# Patient Record
Sex: FEMALE | Race: BLACK OR AFRICAN AMERICAN | NOT HISPANIC OR LATINO | Employment: FULL TIME | URBAN - METROPOLITAN AREA
[De-identification: names, ages, dates, MRNs, and addresses within clinical notes are randomized per-mention and may not be internally consistent; named-entity substitution may affect disease eponyms.]

---

## 2017-09-25 ENCOUNTER — OFFICE VISIT (OUTPATIENT)
Dept: INTERNAL MEDICINE | Facility: CLINIC | Age: 37
End: 2017-09-25
Attending: FAMILY MEDICINE
Payer: MEDICAID

## 2017-09-25 VITALS
BODY MASS INDEX: 48.82 KG/M2 | WEIGHT: 293 LBS | OXYGEN SATURATION: 99 % | SYSTOLIC BLOOD PRESSURE: 158 MMHG | HEIGHT: 65 IN | TEMPERATURE: 98 F | DIASTOLIC BLOOD PRESSURE: 102 MMHG | HEART RATE: 80 BPM

## 2017-09-25 DIAGNOSIS — F32.A DEPRESSION, UNSPECIFIED DEPRESSION TYPE: ICD-10-CM

## 2017-09-25 DIAGNOSIS — R29.818 NEUROLOGIC ABNORMALITY: Primary | ICD-10-CM

## 2017-09-25 DIAGNOSIS — E66.01 MORBID OBESITY DUE TO EXCESS CALORIES: ICD-10-CM

## 2017-09-25 DIAGNOSIS — I10 ESSENTIAL HYPERTENSION: ICD-10-CM

## 2017-09-25 DIAGNOSIS — K21.9 GASTROESOPHAGEAL REFLUX DISEASE, ESOPHAGITIS PRESENCE NOT SPECIFIED: ICD-10-CM

## 2017-09-25 DIAGNOSIS — M25.519 SHOULDER PAIN, UNSPECIFIED CHRONICITY, UNSPECIFIED LATERALITY: ICD-10-CM

## 2017-09-25 DIAGNOSIS — Z00.00 HEALTHCARE MAINTENANCE: ICD-10-CM

## 2017-09-25 PROCEDURE — 99205 OFFICE O/P NEW HI 60 MIN: CPT | Mod: PBBFAC,PO | Performed by: INTERNAL MEDICINE

## 2017-09-25 PROCEDURE — 99204 OFFICE O/P NEW MOD 45 MIN: CPT | Mod: S$PBB,,, | Performed by: INTERNAL MEDICINE

## 2017-09-25 PROCEDURE — 99999 PR PBB SHADOW E&M-NEW PATIENT-LVL V: CPT | Mod: PBBFAC,,, | Performed by: INTERNAL MEDICINE

## 2017-09-25 RX ORDER — PANTOPRAZOLE SODIUM 20 MG/1
20 TABLET, DELAYED RELEASE ORAL DAILY
Qty: 30 TABLET | Refills: 11 | Status: SHIPPED | OUTPATIENT
Start: 2017-09-25 | End: 2018-09-25

## 2017-09-25 RX ORDER — LISINOPRIL AND HYDROCHLOROTHIAZIDE 12.5; 2 MG/1; MG/1
1 TABLET ORAL DAILY
Qty: 90 TABLET | Refills: 3 | Status: SHIPPED | OUTPATIENT
Start: 2017-09-25 | End: 2020-07-02 | Stop reason: SDUPTHER

## 2017-09-25 RX ORDER — LISINOPRIL AND HYDROCHLOROTHIAZIDE 12.5; 2 MG/1; MG/1
TABLET ORAL
Refills: 2 | COMMUNITY
Start: 2017-07-12 | End: 2017-09-25 | Stop reason: SDUPTHER

## 2017-09-25 NOTE — PATIENT INSTRUCTIONS
Exercises for Shoulder Flexibility: Internal Rotation    This stretch can help restore shoulder flexibility and relieve pain over time. When stretching, be sure to breathe deeply. And follow any special instructions from your doctor or physical therapist.  · While seated, move the arm on the side you want to stretch toward the middle of your back. The palm of your hand should face out.  · Cup your other hand under the hand thats behind your back. Gently push your cupped hand upward until you feel the stretch in the shoulder. Try to hold the stretch for 5 seconds.  · Work up to doing 3 sets of this stretch, 3 times a day. Work up to holding the stretch for 30-60 seconds.  Note: Keep your back straight. Its okay if your hand cant reach the middle of your back. Instead, start the stretch with your hand as close as you can get it to the middle of your back.     Frozen Shoulder  Frozen shoulder is another name for adhesive capsulitis, which causes restricted movement in the shoulder. If you have frozen shoulder, this stretch may cause discomfort, especially when you first get started. A few months may pass before you achieve the results you want. But once your shoulder heals, it almost never becomes frozen again. So stick to your stretching program. If you have any questions, be sure to ask your doctor.   © 1887-2412 [employer]. [address] [place] [address], [place] [address]. All rights reserved. This information is not intended as a substitute for professional medical care. Always follow your healthcare professional's instructions.        Exercises for Shoulder Flexibility: Wall Walk  Improving your flexibility can reduce pain. Stretching exercises also can help increase your range of pain-free motion. Breathe normally when you exercise. And try to use smooth, fluid movements.  Note: Follow any special instructions you are given. If you feel pain, stop the exercise. If the pain continues after stopping,  call your healthcare provider.  · Stand with your shoulder about 2 feet from the wall.  · Raise your arm to shoulder level and gently walk your fingers up the wall as high as you can.  · Hold for a few seconds. Then walk your fingers back down.  · Repeat 3 times. Move closer to the wall as you repeat.  · Build up to holding each stretch for 30 seconds.  CAUTION: Do this stretch only if your healthcare provider recommends it. Dont do it when you are first injured.          © 0485-7645 [employer]. [address] [place] [address]. All rights reserved. This information is not intended as a substitute for professional medical care. Always follow your healthcare professional's instructions.        Exercises for Shoulder Flexibility: Pendulum Exercise   Improving your flexibility can reduce pain. Stretching exercises also can help increase your range of pain-free motion. Breathe normally when you exercise. And try to use smooth, fluid movements.  Follow any special instructions you are given. If you feel pain, stop the exercise. If the pain continues after stopping, call your health care provider.  · Lean over with your good arm supported on a table or chair.  · Relax the arm on the painful side, letting it hang straight down.  · Slowly begin to swing the relaxed arm. Move it in a small [employer], gradually making it bigger if you can. Then reverse the direction. Next, move it backward and forward. Finally, move it side to side.     Note: Spend about 5 minutes doing the exercise, 3 times a day. Change direction after 1 minute of motion.   © 9722-6957 [employer]. [address] [place] [address]. All rights reserved. This information is not intended as a substitute for professional medical care. Always follow your healthcare professional's instructions.        Exercises for Shoulder Flexibility: Broom Stretch    Improving your flexibility can reduce pain. Stretching exercises  also can help increase your range of pain-free motion. Breathe normally when you exercise. Try to use smooth, fluid movements. Never force a stretch.  · Stand up or lie on the floor. Place the palm of your hand over the end of a broomstick or cane. Grasp the stick farther down with the other hand, palm facing down.  · Push the end of the stick up on the side of your injured shoulder as high as is comfortable.  · Hold for a few seconds. Return to the starting position.  · Repeat 3-5 times. Build up to holding each stretch for 10-30  seconds.  Note: Follow any special instructions you are given. If you feel pain, stop the exercise. If the pain continues after stopping, call your healthcare provider.   © 1239-3587 [employer]. [address] [place] [address], [place] [address]. All rights reserved. This information is not intended as a substitute for professional medical care. Always follow your healthcare professional's instructions.

## 2017-09-25 NOTE — PROGRESS NOTES
Subjective:       Patient ID: Yane Dobbins is a 37 y.o. female.    Chief Complaint: Establish Care; Foot Pain (both heels, right foot numbing, ); Arm Pain (right side when lifting numbing/heavy, arm just drop); Hypertension; and Leg Pain (right side feels like hip down leg thigh and burning sensation in calf)    Ms. Dobbins is a 37-year-old woman with GERD, HTN, morbid obesity, reported fibromyalgia, and MDD/anxiety who presents to [facility]. She was recently discharged from [facility] after an admission for neurologic abnormalities, which appear to have started in 2014 with numbness of the 3rd, 4th, and 5th digits of her right hand that slowly progressed over the course of a year to involve her entire right arm. In 2016 she began to experience a similar phenomenon in her right lower extremity, starting with numbness in her right 3rd, 4th, and 5th toes eventually progressing to numbness of the entire right lower extremity. In 2017 this advanced to neuropathic pain (shooting, tingling), worst after rest. She has not sought any medical care for these symptoms. Last Wednesday she experienced the sudden onset of complete numbness and paralysis of her right side, along with blurry vision in both eyes. She reports being worked up for TIA with CT of her head, MRI of her neck, ultrasound examination of her carotid arteries bilaterally, and a TTE, all of which was reportedly normal. Throughout this she has experienced significantly worsening right shoulder discomfort, worst with abduction of her right upper extremity and any rotation. 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       Review of Systems   Constitutional: Negative for chills, fatigue, fever and unexpected weight change.   HENT: Negative for congestion, ear pain, hearing loss, mouth sores, rhinorrhea, sinus pressure and sore throat.    Eyes: Positive for visual disturbance. Negative  for pain and redness.   Respiratory: Negative for cough, shortness of breath and wheezing.    Cardiovascular: Negative for chest pain, palpitations and leg swelling.   Gastrointestinal: Negative for abdominal distention, abdominal pain, blood in stool, constipation, diarrhea, nausea and vomiting.   Endocrine: Negative for cold intolerance, heat intolerance and polyuria.   Genitourinary: Negative for difficulty urinating, dysuria, menstrual problem, vaginal bleeding and vaginal discharge.   Musculoskeletal: Positive for arthralgias, gait problem and myalgias.   Skin: Negative for rash and wound.   Allergic/Immunologic: Negative for environmental allergies and food allergies.   Neurological: Positive for weakness and numbness. Negative for dizziness, seizures and headaches.   Hematological: Negative for adenopathy. Does not bruise/bleed easily.   Psychiatric/Behavioral: Positive for dysphoric mood. Negative for agitation. The patient is not nervous/anxious.        Past Medical History:   Diagnosis Date    Anxiety     Depression     GERD (gastroesophageal reflux disease)     H/O fibromyalgia     Obesity     TIA (transient ischemic attack)     Toxemia in pregnancy        Past Surgical History:   Procedure Laterality Date     SECTION      CHOLECYSTECTOMY      FOOT SURGERY         Family History   Problem Relation Age of Onset    Hypertension Mother     Diabetes Mother     Diabetes Father     Hypertension Father     Hypertension Brother        Social History     Social History    Marital status: Single     Spouse name: N/A    Number of children: N/A    Years of education: N/A     Occupational History    Not on file.     Social History Main Topics    Smoking status: Passive Smoke Exposure - Never Smoker     Last attempt to quit: 1998    Smokeless tobacco: Never Used      Comment: in high school    Alcohol use No    Drug use: No    Sexual activity: No     Other Topics Concern    Not on  "file     Social History Narrative    No narrative on file       Objective:         Vitals:    09/25/17 1513   BP: (!) 158/102   Pulse: 80   Temp: 98.4 °F (36.9 °C)   SpO2: 99%   Weight: (!) 160.8 kg (354 lb 8 oz)   Height: 5' 5" (1.651 m)     Physical Exam   Constitutional: She is oriented to person, place, and time. She appears well-developed and well-nourished. No distress.   HENT:   Head: Normocephalic and atraumatic.   Eyes: Conjunctivae and EOM are normal. Pupils are equal, round, and reactive to light.   Neck: Normal range of motion. Neck supple. No JVD present.   Cardiovascular: Normal rate and regular rhythm.  Exam reveals no gallop and no friction rub.    No murmur heard.  Pulmonary/Chest: Effort normal and breath sounds normal. No respiratory distress. She has no wheezes.   Abdominal: Soft. Bowel sounds are normal. She exhibits no distension and no mass. There is no tenderness.   Morbidly obese   Musculoskeletal: She exhibits tenderness. She exhibits no edema.   Right shoulder pain with passive abduction, but good strength without any drop when not supported  Posterior right knee pain   Neurological: She is alert and oriented to person, place, and time. She displays normal reflexes. No cranial nerve deficit.   Skin: Skin is warm and dry. No rash noted. No erythema.   Psychiatric: She has a normal mood and affect. Her behavior is normal.       Assessment:       1. Neurologic abnormality    2. Depression, unspecified depression type    3. Essential hypertension    4. Morbid obesity due to excess calories    5. Gastroesophageal reflux disease, esophagitis presence not specified    6. Shoulder pain, unspecified chronicity, unspecified laterality    7. Healthcare maintenance        Plan:       Yane was seen today for establish care, foot pain, arm pain, hypertension and leg pain.    Diagnoses and all orders for this visit:    Neurologic abnormality  -     Etiology unclear. Beginning basic workup for " peripheral neuropathy with HbA1c, BMP, and EMG + NCS.   -     Requested records from workup at [facility]  -     Referred to neurology for further workup  -     Nerve conduction test; Future  -     EMG - 2 Extremities; Future  -     Ambulatory Referral to Neurology    Depression, unspecified depression type        -     Stable        -     She is on an antihypertensive, but doesn't know which one and will call the clinic tomorrow to tell us what she is taking.    Essential hypertension        -     Mildly worsened today at 158/102, etiology likely pain and stress as she appeared uncomfortable        -     Continue home lisinopril-HCTZ        -     lisinopril-hydrochlorothiazide (PRINZIDE,ZESTORETIC) 20-12.5 mg per tablet; Take 1 tablet by mouth once daily.    Morbid obesity due to excess calories        -     Counseled regarding caloric intake, particularly limiting sodas        -     Will address at subsequent visits    Gastroesophageal reflux disease, esophagitis presence not specified        -     Stable  -     pantoprazole (PROTONIX) 20 MG tablet; Take 1 tablet (20 mg total) by mouth once daily.    Shoulder pain, unspecified chronicity, unspecified laterality  -     Examination consistent with rotator cuff tendinitis, but preserved strength not suggestive of tear  -     Ambulatory consult to Physical Therapy  -     Encouraged NSAIDs PRN for pain    Healthcare maintenance  -     Hemoglobin A1c; Future  -     Lipid panel; Future  -     Basic metabolic panel; Future       Patient seen and discussed with Dr. Hinojosa, who helped formulate the above plan. Will arrange for follow-up evaluation in three months

## 2017-10-04 ENCOUNTER — CLINICAL SUPPORT (OUTPATIENT)
Dept: REHABILITATION | Facility: HOSPITAL | Age: 37
End: 2017-10-04
Payer: MEDICAID

## 2017-10-04 DIAGNOSIS — M54.12 CERVICAL RADICULOPATHY: Primary | ICD-10-CM

## 2017-10-04 PROCEDURE — 97161 PT EVAL LOW COMPLEX 20 MIN: CPT | Mod: PN

## 2017-10-04 NOTE — PROGRESS NOTES
OUTPATIENT PHYSICAL THERAPY  PHYSICAL THERAPY EVALUATION    Name: Yane Dobbins  Clinic Number: [id number]    Evaluation Date: 10/04/2017  Visit #: 1  Precautions: standard     Diagnosis: Cervical Radiculopathy   Physician: Matias Vergara MD  Treatment Orders: PT Eval and Treat  Past Medical History:   Diagnosis Date    Anxiety     Depression     GERD (gastroesophageal reflux disease)     H/O fibromyalgia     Obesity     TIA (transient ischemic attack)     Toxemia in pregnancy      Current Outpatient Prescriptions   Medication Sig    lisinopril-hydrochlorothiazide (PRINZIDE,ZESTORETIC) 20-12.5 mg per tablet Take 1 tablet by mouth once daily.    pantoprazole (PROTONIX) 20 MG tablet Take 1 tablet (20 mg total) by mouth once daily.     No current facility-administered medications for this visit.      Review of patient's allergies indicates:  No Known Allergies    36 y/o female with difficulty reaching and using R arm ongoing for greater than 3 yrs,   Pain started into her neck and has travelled into her arm.   She is unable to lift her arm to brush her hair or teeth due to pain     Subjective     Chief complaint: R arm pain and neck pain   Pain: current 7-8/10 neck and R arm pain   Radicular symptoms: Radial and medial neve distribution tingling into 1st, 2nd, 3rd fingers and palm of hand   Aggravating factors: Raising arm overhead and out to the side  Easing factors: none  Pts goals: decrease pain     Objective   Mental status: alert    Static Postural Assessment:   Fwd head and rounded shlds, R arm held in guarded position    GAIT: Yane ambulates with no assistive device with independently.     GAIT DEVIATIONS: Yane displays decreased chloé    ROM:  Cervical Rotation L = 25 % limited with pain/ R = 25 % limited with pain   Cervical SB L = 50% limited with pain   R shld flexion and abduction  75% limited * pain and guarding       Strength:   Shld strength N/T due to pain and guarding     Shld  flexion < 3/5   Wrist flexion < 3/5     Special Tests:  + ULNTT Median and Radial     Palpation:  TTP C5-6 R, median and radial N. Distribution Upper trap, pronator teres R*, carpal tunnel       Pt/family was provided educational information, including: role of PT, goals for PT, scheduling - pt verbalized understanding. Discussed insurance plan with pt.     TREATMENT   Time In: 3:00 PM   Time Out: 4:00 PM     Discussed Plan of Care with patient: Yes    Yane received  10 minutes of therapeutic exercises: medial and radial neural glides  Upper trap stretches    5 minutes of manual therapy including:    Pt signed written consent to dry needling Rx.  Pt gave verbal consent for DN.    DN Time:     Pt rec'd dry needling to   Homeostatic points:  1. Deep Radial *   2. Greater Auricular  3. Spinal Accessory  4. Saphenous  5. Deep Fibular  6. Tibial  7. Greater Occipital  8. Suprascapular ( infraspinatus) *  9. Lateral Antebrachial Cutaneous *   10. Sural  11 Lateral Popliteal  12. Superficial Radial *   13. Dorsal Scapular *   14. Superior Cluneal  15. Posterior Cutaneous L 2  16. Inferior Gluteal  17. Lateral Pectoral  18. Ilitotibal  19. Infraorbital  20. Spinous process T7  21. Posterior cutaneous  T6  22. Posterior cutaneous L 5  23. Supraorbital  24. Common fibular     Paravertebral Points:   C5-6     Symptomatic Points:   Radial  Mm point, posterior interroseus      with 50-60 mm needles;  with no adverse effects.      Written Home Exercises Provided: yes  Exercises were reviewed and Yane was able to demonstrate them prior to the end of the session. Pt received a written copy of exercises to perform at home. Yane demonstrated good  understanding of the education provided.     Assessment   Yane is a 37 y.o. female referred to outpatient physical therapy with a medical diagnosis of  R cervical radiculopathy.     Demonstrates limitations as described in the problem list.  Medical necessity is demonstrated  by the following IMPAIRMENTS/PROBLEMS:  weakness, impaired sensation, decreased upper extremity function, pain, abnormal tone, decreased ROM, impaired joint extensibility, impaired muscle length and neural tension     Positive prognostic factors include age, healing potential .   Negative prognostic factors include co-morbidities.   Pt prognosis is Good.    Pt will benefit from skilled outpatient physical therapy to address the above stated deficits, provide pt/family education, and to maximize pt's level of independence.       History  Co-morbidities and personal factors that may impact the plan of care Examination  Body Structures and Functions, activity limitations and participation restrictions that may impact the plan of care Clinical Presentation   Decision Making/ Complexity Score   Co-morbidities:   depression, high BMI and history of TIA         Personal Factors:   no deficits Body Regions:   neck  upper extremities    Body Systems:   ROM  strength    Activity limitations:   Learning and applying knowledge  no deficits    General Tasks and Commands  no deficits    Communication  no deficits    Mobility  lifting and carrying objects  fine hand use (grasping/picking up)    Self care  washing oneself (bathing, drying, washing hands)  caring for body parts (brushing teeth, shaving, grooming)    Domestic Life  doing house work (cleaning house, washing dishes, laundry)    Life Areas  higher education    Community and Social Life  no deficits    Participation Restrictions:   Changing Body position due to obesity          evolving clinical presentation with changing clinical characteristics            moderate moderate moderate moderate       Anticipated Barriers for physical therapy: scheduling conflicts, insurance limitations     GOALS: 10 - 12 weeks:   -Full ROM in R arm without pain or neural tension for improved self care activities   -improved postural awareness   -improved activation of lower traps and  rhomboids for improved postural stability   FOTO repoting to predicted level of function         Plan       Outpatient physical therapy 1- 2 times weekly to include: pt ed, HEP, therapeutic exercises, therapeutic activities, neuromuscular re-education/ balance exercises, manual therapy, and modalities prn. Cont PT for  6-8 weeks. Pt may be seen by PTA as part of the rehabilitation team.     I certify the need for these services furnished under this plan of treatment and while under my care.    Parth Mares, PT

## 2017-10-10 NOTE — PLAN OF CARE
OUTPATIENT PHYSICAL THERAPY  PHYSICAL THERAPY EVALUATION    Name: Yane Dobbins  Clinic Number: [id number]    Evaluation Date: 10/04/2017  Visit #: 1  Precautions: standard     Diagnosis: Cervical Radiculopathy   Physician: Matias Vergara MD  Treatment Orders: PT Eval and Treat  Past Medical History:   Diagnosis Date    Anxiety     Depression     GERD (gastroesophageal reflux disease)     H/O fibromyalgia     Obesity     TIA (transient ischemic attack)     Toxemia in pregnancy      Current Outpatient Prescriptions   Medication Sig    lisinopril-hydrochlorothiazide (PRINZIDE,ZESTORETIC) 20-12.5 mg per tablet Take 1 tablet by mouth once daily.    pantoprazole (PROTONIX) 20 MG tablet Take 1 tablet (20 mg total) by mouth once daily.     No current facility-administered medications for this visit.      Review of patient's allergies indicates:  No Known Allergies    38 y/o female with difficulty reaching and using R arm ongoing for greater than 3 yrs,   Pain started into her neck and has travelled into her arm.   She is unable to lift her arm to brush her hair or teeth due to pain     Subjective     Chief complaint: R arm pain and neck pain   Pain: current 7-8/10 neck and R arm pain   Radicular symptoms: Radial and medial neve distribution tingling into 1st, 2nd, 3rd fingers and palm of hand   Aggravating factors: Raising arm overhead and out to the side  Easing factors: none  Pts goals: decrease pain     Objective   Mental status: alert    Static Postural Assessment:   Fwd head and rounded shlds, R arm held in guarded position    GAIT: Yane ambulates with no assistive device with independently.     GAIT DEVIATIONS: Yane displays decreased chloé    ROM:  Cervical Rotation L = 25 % limited with pain/ R = 25 % limited with pain   Cervical SB L = 50% limited with pain   R shld flexion and abduction  75% limited * pain and guarding       Strength:   Shld strength N/T due to pain and guarding     Shld  flexion < 3/5   Wrist flexion < 3/5     Special Tests:  + ULNTT Median and Radial     Palpation:  TTP C5-6 R, median and radial N. Distribution Upper trap, pronator teres R*, carpal tunnel       Pt/family was provided educational information, including: role of PT, goals for PT, scheduling - pt verbalized understanding. Discussed insurance plan with pt.     TREATMENT   Time In: 3:00 PM   Time Out: 4:00 PM     Discussed Plan of Care with patient: Yes    Yane received  10 minutes of therapeutic exercises: medial and radial neural glides  Upper trap stretches    5 minutes of manual therapy including:    Pt signed written consent to dry needling Rx.  Pt gave verbal consent for DN.    DN Time:     Pt rec'd dry needling to   Homeostatic points:  1. Deep Radial *   2. Greater Auricular  3. Spinal Accessory  4. Saphenous  5. Deep Fibular  6. Tibial  7. Greater Occipital  8. Suprascapular ( infraspinatus) *  9. Lateral Antebrachial Cutaneous *   10. Sural  11 Lateral Popliteal  12. Superficial Radial *   13. Dorsal Scapular *   14. Superior Cluneal  15. Posterior Cutaneous L 2  16. Inferior Gluteal  17. Lateral Pectoral  18. Ilitotibal  19. Infraorbital  20. Spinous process T7  21. Posterior cutaneous  T6  22. Posterior cutaneous L 5  23. Supraorbital  24. Common fibular     Paravertebral Points:   C5-6     Symptomatic Points:   Radial  Mm point, posterior interroseus      with 50-60 mm needles;  with no adverse effects.      Written Home Exercises Provided: yes  Exercises were reviewed and Yane was able to demonstrate them prior to the end of the session. Pt received a written copy of exercises to perform at home. Yane demonstrated good  understanding of the education provided.     Assessment   Yane is a 37 y.o. female referred to outpatient physical therapy with a medical diagnosis of  R cervical radiculopathy.     Demonstrates limitations as described in the problem list.  Medical necessity is demonstrated  by the following IMPAIRMENTS/PROBLEMS:  weakness, impaired sensation, decreased upper extremity function, pain, abnormal tone, decreased ROM, impaired joint extensibility, impaired muscle length and neural tension     Positive prognostic factors include age, healing potential .   Negative prognostic factors include co-morbidities.   Pt prognosis is Good.    Pt will benefit from skilled outpatient physical therapy to address the above stated deficits, provide pt/family education, and to maximize pt's level of independence.       History  Co-morbidities and personal factors that may impact the plan of care Examination  Body Structures and Functions, activity limitations and participation restrictions that may impact the plan of care Clinical Presentation   Decision Making/ Complexity Score   Co-morbidities:   depression, high BMI and history of TIA         Personal Factors:   no deficits Body Regions:   neck  upper extremities    Body Systems:   ROM  strength    Activity limitations:   Learning and applying knowledge  no deficits    General Tasks and Commands  no deficits    Communication  no deficits    Mobility  lifting and carrying objects  fine hand use (grasping/picking up)    Self care  washing oneself (bathing, drying, washing hands)  caring for body parts (brushing teeth, shaving, grooming)    Domestic Life  doing house work (cleaning house, washing dishes, laundry)    Life Areas  higher education    Community and Social Life  no deficits    Participation Restrictions:   Changing Body position due to obesity          evolving clinical presentation with changing clinical characteristics            moderate moderate moderate moderate       Anticipated Barriers for physical therapy: scheduling conflicts, insurance limitations     GOALS: 10 - 12 weeks:   -Full ROM in R arm without pain or neural tension for improved self care activities   -improved postural awareness   -improved activation of lower traps and  rhomboids for improved postural stability   FOTO repoting to predicted level of function         Plan       Outpatient physical therapy 1- 2 times weekly to include: pt ed, HEP, therapeutic exercises, therapeutic activities, neuromuscular re-education/ balance exercises, manual therapy, and modalities prn. Cont PT for  6-8 weeks. Pt may be seen by PTA as part of the rehabilitation team.     I certify the need for these services furnished under this plan of treatment and while under my care.    Parth Mares, PT

## 2017-10-25 ENCOUNTER — TELEPHONE (OUTPATIENT)
Dept: INTERNAL MEDICINE | Facility: CLINIC | Age: 37
End: 2017-10-25

## 2017-10-25 NOTE — TELEPHONE ENCOUNTER
----- Message from Payal Nunez Dayne sent at 10/23/2017  4:04 PM CDT -----  Contact: Patient [phone]  Attn:  Matias Vergara MD    Patient stated that she went back to work too soon and needs a paper from you.    Please call and advise.    Thank You

## 2017-10-25 NOTE — TELEPHONE ENCOUNTER
Hi,  She needs to come in for to be evaluated for a work excuse note. I see that she did not keep the appts for physical therapy and lab work.  Ideally she should see Dr Vergara on the week starting 11/6 since he saw her the first time. OK to add her on to his schedule.  Thank you, Дмитрий Hinojosa

## 2017-10-25 NOTE — TELEPHONE ENCOUNTER
Spoke with patient, states she went back to work the 1st week of October but had to take off because she is in a lot of pain. States that she barely has any use of her rt arm or leg without pain. Patient is asking for something stating that she can be out of work until she can see a neurologist. States that the neurologist that she has seen prev does not have any appts until April 2018. Advised patient that we may be able to send referral to a different provider and she can call her insurance company to find out who is covered. Also advised patient that she should call her job and start paperwork for leave. Patient is asking if she can have a work excuse note. Please advise

## 2017-10-26 NOTE — TELEPHONE ENCOUNTER
Spoke with patient, states that she will need a morning appt. Patient scheduled to see Dr. Hinojosa on 11/6/17 at 10 AM. Also rescheduled labs. Patient states that the PT that she was referred to was far and she was able to find one closer. Will call back with information for referral for PT and Neuro.

## 2017-10-27 ENCOUNTER — LAB VISIT (OUTPATIENT)
Dept: LAB | Facility: HOSPITAL | Age: 37
End: 2017-10-27
Attending: INTERNAL MEDICINE
Payer: MEDICAID

## 2017-10-27 DIAGNOSIS — Z00.00 HEALTHCARE MAINTENANCE: ICD-10-CM

## 2017-10-27 LAB
ANION GAP SERPL CALC-SCNC: 6 MMOL/L
BUN SERPL-MCNC: 17 MG/DL
CALCIUM SERPL-MCNC: 9.1 MG/DL
CHLORIDE SERPL-SCNC: 105 MMOL/L
CHOLEST SERPL-MCNC: 197 MG/DL
CHOLEST/HDLC SERPL: 3.3 {RATIO}
CO2 SERPL-SCNC: 26 MMOL/L
CREAT SERPL-MCNC: 0.7 MG/DL
EST. GFR  (AFRICAN AMERICAN): >60 ML/MIN/1.73 M^2
EST. GFR  (NON AFRICAN AMERICAN): >60 ML/MIN/1.73 M^2
ESTIMATED AVG GLUCOSE: 114 MG/DL
GLUCOSE SERPL-MCNC: 94 MG/DL
HBA1C MFR BLD HPLC: 5.6 %
HDLC SERPL-MCNC: 60 MG/DL
HDLC SERPL: 30.5 %
LDLC SERPL CALC-MCNC: 125.2 MG/DL
NONHDLC SERPL-MCNC: 137 MG/DL
POTASSIUM SERPL-SCNC: 4.2 MMOL/L
SODIUM SERPL-SCNC: 137 MMOL/L
TRIGL SERPL-MCNC: 59 MG/DL

## 2017-10-27 PROCEDURE — 80061 LIPID PANEL: CPT

## 2017-10-27 PROCEDURE — 36415 COLL VENOUS BLD VENIPUNCTURE: CPT | Mod: PO

## 2017-10-27 PROCEDURE — 80048 BASIC METABOLIC PNL TOTAL CA: CPT

## 2017-10-27 PROCEDURE — 83036 HEMOGLOBIN GLYCOSYLATED A1C: CPT

## 2017-11-15 ENCOUNTER — PATIENT MESSAGE (OUTPATIENT)
Dept: INTERNAL MEDICINE | Facility: CLINIC | Age: 37
End: 2017-11-15

## 2017-11-15 ENCOUNTER — TELEPHONE (OUTPATIENT)
Dept: NEUROLOGY | Facility: CLINIC | Age: 37
End: 2017-11-15

## 2017-11-15 DIAGNOSIS — M54.12 CERVICAL RADICULOPATHY: Primary | ICD-10-CM

## 2017-11-15 DIAGNOSIS — F32.1 MODERATE SINGLE CURRENT EPISODE OF MAJOR DEPRESSIVE DISORDER: ICD-10-CM

## 2017-11-15 NOTE — TELEPHONE ENCOUNTER
Returned call and spoke with patient. Informed her we did not have any medicaid spots available currently. Numbers given to patient for [facility]. Patient verbalized understanding.

## 2017-11-15 NOTE — TELEPHONE ENCOUNTER
----- Message from Jose Deluna sent at 11/15/2017  8:42 AM CST -----  Contact: self   Patient want to speak with a nurse regarding scheduling an appointment. Patient has numbness on the right side from shoulder to foot. Please call back at [phone]

## 2017-11-15 NOTE — TELEPHONE ENCOUNTER
Called patient to inform that PT referral was placed and to confirm if a referral is needed for Psych and if she has a fax #.

## 2019-02-19 ENCOUNTER — TELEPHONE (OUTPATIENT)
Dept: SURGERY | Facility: CLINIC | Age: 39
End: 2019-02-19

## 2019-02-19 ENCOUNTER — PATIENT MESSAGE (OUTPATIENT)
Dept: INTERNAL MEDICINE | Facility: CLINIC | Age: 39
End: 2019-02-19

## 2019-02-19 ENCOUNTER — TELEPHONE (OUTPATIENT)
Dept: INTERNAL MEDICINE | Facility: CLINIC | Age: 39
End: 2019-02-19

## 2019-02-19 NOTE — TELEPHONE ENCOUNTER
----- Message from Krysta Jay sent at 2/19/2019  3:26 PM CST -----  Contact: PT Portal Request  Appointment Request From: Yane Dobbins    With Provider: Dr Raven Cunningham    Preferred Date Range: 2/22/2019 - 3/15/2019    Preferred Times: Any time    Reason for visit: New Patient    Comments:  Weight Loss: Gastric Sleeve

## 2019-02-19 NOTE — TELEPHONE ENCOUNTER
----- Message from Krysta Jay sent at 2/19/2019  3:26 PM CST -----  Contact: PT Portal Request  Appointment Request From: Yane Dobbins    With Provider: Elisa Ceballos MD [[facility]]    Preferred Date Range: 2/22/2019 - 3/4/2019    Preferred Times: Any time    Reason for visit: Existing Patient    Comments:  Nerve Pain on right side (arm, hand, knee,leg,shoulder); Foot Pain (Both heels,left foot-side of foot); Breathing and swallowing problems

## 2019-03-08 NOTE — TELEPHONE ENCOUNTER
Called patient.  She confirmed that she has Medicaid.  Notified Ms. Dobbins that the [facility] does not accept Medicaid.  Discussed other facility options including [place] and [facility].  Patient verbalized understanding

## 2019-07-25 ENCOUNTER — TELEPHONE (OUTPATIENT)
Dept: INTERNAL MEDICINE | Facility: CLINIC | Age: 39
End: 2019-07-25

## 2019-07-25 ENCOUNTER — PATIENT MESSAGE (OUTPATIENT)
Dept: INTERNAL MEDICINE | Facility: CLINIC | Age: 39
End: 2019-07-25

## 2019-07-25 NOTE — TELEPHONE ENCOUNTER
----- Message from Xochitl Cannon sent at 2019  7:53 AM CDT -----  Contact: my chart  Yane Dobbins, 38 yrs,   MRN:   [id number]  ::   1980  Lang:   English  Last BMI:   None  Pt Omar Status:   Elapsed  Prim Cvg::   MEDICAID/UHC COMMUNITY PLAN [facility] ([place] MEDICAID)  Cvg Last Verified Date:   2018  Patient Types:   None  PCP:   Elisa Ceballos MD  Care Team:     Allergies:   No Known Allergies  Patient Portal:   Active, 2019 3:39 PM  No HIPAA  FYI  None   More Detail >>  Appointment Request  Yane Dobbins  Sent:  12:11 PM  To: [facility]     Yane Dobbins  MRN: [id number] : 1980  Pt Home: [phone]    Entered: [phone]      Message     Appointment Request From: Yane Dobbins    With Provider: Elisa Ceballos MD [[facility] - Internal Medicine]    Preferred Date Range: 2019 - 2019    Preferred Times: Any time    Reason for visit: Existing Patient    Comments:  Weight Loss Surgery  Knee/Joint Pain with Daily Stiffness  Back Pain

## 2019-10-17 ENCOUNTER — TELEPHONE (OUTPATIENT)
Dept: PODIATRY | Facility: CLINIC | Age: 39
End: 2019-10-17

## 2019-10-17 NOTE — TELEPHONE ENCOUNTER
----- Message from Raven Tim sent at 10/17/2019 10:43 AM CDT -----  Contact: pt  Type: Needs Medical Advice    Who Called:  Pt  Best Call Back Number:   Additional Information: Pt is requesting a call back from Nurse regarding access to an appt TODAY pt will be a NP,please call with advice

## 2020-07-02 ENCOUNTER — LAB VISIT (OUTPATIENT)
Dept: LAB | Facility: HOSPITAL | Age: 40
End: 2020-07-02
Payer: MEDICAID

## 2020-07-02 ENCOUNTER — OFFICE VISIT (OUTPATIENT)
Dept: INTERNAL MEDICINE | Facility: CLINIC | Age: 40
End: 2020-07-02
Payer: MEDICAID

## 2020-07-02 VITALS
OXYGEN SATURATION: 99 % | WEIGHT: 293 LBS | SYSTOLIC BLOOD PRESSURE: 130 MMHG | BODY MASS INDEX: 51.91 KG/M2 | HEART RATE: 101 BPM | DIASTOLIC BLOOD PRESSURE: 60 MMHG | HEIGHT: 63 IN

## 2020-07-02 DIAGNOSIS — M79.7 FIBROMYALGIA: ICD-10-CM

## 2020-07-02 DIAGNOSIS — K44.9 HIATAL HERNIA WITH GERD: ICD-10-CM

## 2020-07-02 DIAGNOSIS — I10 ESSENTIAL HYPERTENSION: Primary | ICD-10-CM

## 2020-07-02 DIAGNOSIS — F41.9 ANXIETY: ICD-10-CM

## 2020-07-02 DIAGNOSIS — Z00.00 ANNUAL PHYSICAL EXAM: ICD-10-CM

## 2020-07-02 DIAGNOSIS — M54.9 BACK PAIN, UNSPECIFIED BACK LOCATION, UNSPECIFIED BACK PAIN LATERALITY, UNSPECIFIED CHRONICITY: ICD-10-CM

## 2020-07-02 DIAGNOSIS — M54.12 CERVICAL RADICULOPATHY: ICD-10-CM

## 2020-07-02 DIAGNOSIS — I10 ESSENTIAL HYPERTENSION: ICD-10-CM

## 2020-07-02 DIAGNOSIS — M25.561 CHRONIC PAIN OF BOTH KNEES: ICD-10-CM

## 2020-07-02 DIAGNOSIS — K21.9 HIATAL HERNIA WITH GERD: ICD-10-CM

## 2020-07-02 DIAGNOSIS — E66.01 MORBID OBESITY: ICD-10-CM

## 2020-07-02 DIAGNOSIS — R06.83 SNORING: ICD-10-CM

## 2020-07-02 DIAGNOSIS — G89.29 CHRONIC PAIN OF BOTH KNEES: ICD-10-CM

## 2020-07-02 DIAGNOSIS — M25.562 CHRONIC PAIN OF BOTH KNEES: ICD-10-CM

## 2020-07-02 LAB
ALBUMIN SERPL BCP-MCNC: 3.6 G/DL (ref 3.5–5.2)
ALP SERPL-CCNC: 77 U/L (ref 55–135)
ALT SERPL W/O P-5'-P-CCNC: 25 U/L (ref 10–44)
ANION GAP SERPL CALC-SCNC: 8 MMOL/L (ref 8–16)
AST SERPL-CCNC: 23 U/L (ref 10–40)
BASOPHILS # BLD AUTO: 0.05 K/UL (ref 0–0.2)
BASOPHILS NFR BLD: 1.1 % (ref 0–1.9)
BILIRUB SERPL-MCNC: 0.5 MG/DL (ref 0.1–1)
BUN SERPL-MCNC: 16 MG/DL (ref 6–20)
CALCIUM SERPL-MCNC: 9.5 MG/DL (ref 8.7–10.5)
CHLORIDE SERPL-SCNC: 106 MMOL/L (ref 95–110)
CO2 SERPL-SCNC: 27 MMOL/L (ref 23–29)
CREAT SERPL-MCNC: 0.8 MG/DL (ref 0.5–1.4)
DIFFERENTIAL METHOD: ABNORMAL
EOSINOPHIL # BLD AUTO: 0.1 K/UL (ref 0–0.5)
EOSINOPHIL NFR BLD: 2.6 % (ref 0–8)
ERYTHROCYTE [DISTWIDTH] IN BLOOD BY AUTOMATED COUNT: 13.5 % (ref 11.5–14.5)
EST. GFR  (AFRICAN AMERICAN): >60 ML/MIN/1.73 M^2
EST. GFR  (NON AFRICAN AMERICAN): >60 ML/MIN/1.73 M^2
ESTIMATED AVG GLUCOSE: 126 MG/DL (ref 68–131)
FOLATE SERPL-MCNC: 12.8 NG/ML (ref 4–24)
GLUCOSE SERPL-MCNC: 95 MG/DL (ref 70–110)
HBA1C MFR BLD HPLC: 6 % (ref 4–5.6)
HCT VFR BLD AUTO: 43.1 % (ref 37–48.5)
HGB BLD-MCNC: 13.3 G/DL (ref 12–16)
IMM GRANULOCYTES # BLD AUTO: 0.01 K/UL (ref 0–0.04)
IMM GRANULOCYTES NFR BLD AUTO: 0.2 % (ref 0–0.5)
LYMPHOCYTES # BLD AUTO: 2 K/UL (ref 1–4.8)
LYMPHOCYTES NFR BLD: 43.8 % (ref 18–48)
MCH RBC QN AUTO: 29.2 PG (ref 27–31)
MCHC RBC AUTO-ENTMCNC: 30.9 G/DL (ref 32–36)
MCV RBC AUTO: 95 FL (ref 82–98)
MONOCYTES # BLD AUTO: 0.6 K/UL (ref 0.3–1)
MONOCYTES NFR BLD: 12.3 % (ref 4–15)
NEUTROPHILS # BLD AUTO: 1.8 K/UL (ref 1.8–7.7)
NEUTROPHILS NFR BLD: 40 % (ref 38–73)
NRBC BLD-RTO: 0 /100 WBC
PLATELET # BLD AUTO: 281 K/UL (ref 150–350)
PMV BLD AUTO: 11.1 FL (ref 9.2–12.9)
POTASSIUM SERPL-SCNC: 4.3 MMOL/L (ref 3.5–5.1)
PROT SERPL-MCNC: 7.6 G/DL (ref 6–8.4)
RBC # BLD AUTO: 4.55 M/UL (ref 4–5.4)
SODIUM SERPL-SCNC: 141 MMOL/L (ref 136–145)
TSH SERPL DL<=0.005 MIU/L-ACNC: 1.07 UIU/ML (ref 0.4–4)
VIT B12 SERPL-MCNC: 552 PG/ML (ref 210–950)
WBC # BLD AUTO: 4.54 K/UL (ref 3.9–12.7)

## 2020-07-02 PROCEDURE — 80053 COMPREHEN METABOLIC PANEL: CPT

## 2020-07-02 PROCEDURE — 85025 COMPLETE CBC W/AUTO DIFF WBC: CPT

## 2020-07-02 PROCEDURE — 83036 HEMOGLOBIN GLYCOSYLATED A1C: CPT

## 2020-07-02 PROCEDURE — 36415 COLL VENOUS BLD VENIPUNCTURE: CPT

## 2020-07-02 PROCEDURE — 86703 HIV-1/HIV-2 1 RESULT ANTBDY: CPT

## 2020-07-02 PROCEDURE — 99999 PR PBB SHADOW E&M-EST. PATIENT-LVL V: ICD-10-PCS | Mod: PBBFAC,,, | Performed by: STUDENT IN AN ORGANIZED HEALTH CARE EDUCATION/TRAINING PROGRAM

## 2020-07-02 PROCEDURE — 99215 OFFICE O/P EST HI 40 MIN: CPT | Mod: PBBFAC | Performed by: STUDENT IN AN ORGANIZED HEALTH CARE EDUCATION/TRAINING PROGRAM

## 2020-07-02 PROCEDURE — 99214 OFFICE O/P EST MOD 30 MIN: CPT | Mod: S$PBB,,, | Performed by: STUDENT IN AN ORGANIZED HEALTH CARE EDUCATION/TRAINING PROGRAM

## 2020-07-02 PROCEDURE — 82607 VITAMIN B-12: CPT

## 2020-07-02 PROCEDURE — 99214 PR OFFICE/OUTPT VISIT, EST, LEVL IV, 30-39 MIN: ICD-10-PCS | Mod: S$PBB,,, | Performed by: STUDENT IN AN ORGANIZED HEALTH CARE EDUCATION/TRAINING PROGRAM

## 2020-07-02 PROCEDURE — 84443 ASSAY THYROID STIM HORMONE: CPT

## 2020-07-02 PROCEDURE — 99999 PR PBB SHADOW E&M-EST. PATIENT-LVL V: CPT | Mod: PBBFAC,,, | Performed by: STUDENT IN AN ORGANIZED HEALTH CARE EDUCATION/TRAINING PROGRAM

## 2020-07-02 PROCEDURE — 82746 ASSAY OF FOLIC ACID SERUM: CPT

## 2020-07-02 RX ORDER — LISINOPRIL AND HYDROCHLOROTHIAZIDE 12.5; 2 MG/1; MG/1
1 TABLET ORAL DAILY
Qty: 90 TABLET | Refills: 3 | Status: SHIPPED | OUTPATIENT
Start: 2020-07-02 | End: 2020-09-14

## 2020-07-02 RX ORDER — PANTOPRAZOLE SODIUM 20 MG/1
20 TABLET, DELAYED RELEASE ORAL DAILY
Qty: 30 TABLET | Refills: 1 | Status: SHIPPED | OUTPATIENT
Start: 2020-07-02 | End: 2020-09-14 | Stop reason: SDUPTHER

## 2020-07-02 NOTE — PATIENT INSTRUCTIONS
Please resume your care with bariatrics at [facility]:   [facility]    [address] [place] [address]    [phone]   Daivd Whatley RN    [address] [place] [address]

## 2020-07-02 NOTE — PROGRESS NOTES
"Subjective:       Patient ID: Yane Dobbins is a 39 y.o. female.    Chief Complaint: Foot Pain (both feet), Arm Pain (right), Knee Pain (both), Gastroesophageal Reflux, Medication Refill, and Obesity    HPI   Ms. Dobbins is a 37-year-old woman with GERD associated w/ small hiatal hernia, HTN, morbid obesity, reported fibromyalgia, and reported MDD/anxiety/BP1 who presents to re-establish care.     Pt presents with multiple complaints today:  -BL knee pain R>L, chronic; notes R knee catching and slipping underneath her leading to falls  -BL heel pain, chronic  -Back pain, chronic; particularly when she is lying down   -R sided cervical radiculopathy with paresthesias of R arm; chronic but constant    Reports that ROM also limited on R side due to pain; unable to use R arm for ADLs such as combing hair, bathing    Denies any trauma related to above pains. However, does not difficulty with getting moving first thing in the morning. She notes generalized stiffness.     -Snoring associated with daytime fatigue    -Obesity   Pt had an appt / [facility] bariat[facility] earlier this year, but cancelled because she was not sure that she wanted to purse surgery    -Refills:   HTN: takes lisinopril-HCTZ 20-12.5mg   GERD: previously on Protonix     Notes that she has a lot of medical issues however has "just been dealing" with them because she does not like to complain.     Diet:  Reports low calorie diet consisting of fresh veggies, fruits, and protein    Exercise:  Minimal to none; limited by knee pain    Review of Systems   Constitutional: Positive for activity change and fatigue. Negative for chills and fever.   HENT: Negative for nasal congestion, sneezing and sore throat.    Eyes: Negative for visual disturbance.   Respiratory: Positive for shortness of breath. Negative for cough and chest tightness.    Cardiovascular: Positive for palpitations and leg swelling. Negative for chest pain.   Gastrointestinal: Positive for nausea. " "Negative for abdominal pain, constipation and vomiting.   Genitourinary: Negative for dysuria and hematuria.   Musculoskeletal: Positive for arthralgias and back pain.   Neurological: Positive for dizziness, weakness, light-headedness and headaches.         Objective:       Vitals:    07/02/20 1019   BP: 130/60   BP Location: Right arm   Patient Position: Sitting   BP Method: Large (Manual)   Pulse: 101   SpO2: 99%   Weight: (!) 166.1 kg (366 lb 2.9 oz)   Height: 5' 3" (1.6 m)       Physical Exam  Vitals signs and nursing note reviewed.   Constitutional:       General: She is not in acute distress.     Appearance: She is obese.   HENT:      Head: Normocephalic and atraumatic.      Right Ear: External ear normal.      Left Ear: External ear normal.      Nose: No congestion.      Mouth/Throat:      Mouth: Mucous membranes are moist.      Pharynx: Oropharynx is clear. No oropharyngeal exudate or posterior oropharyngeal erythema.   Eyes:      General: No scleral icterus.     Conjunctiva/sclera: Conjunctivae normal.   Neck:      Musculoskeletal: Normal range of motion. No muscular tenderness.   Cardiovascular:      Rate and Rhythm: Normal rate.      Pulses: Normal pulses.      Heart sounds: Normal heart sounds. No murmur.   Pulmonary:      Effort: Pulmonary effort is normal.      Breath sounds: No wheezing or rhonchi.   Abdominal:      Tenderness: There is no abdominal tenderness.   Musculoskeletal:         General: Swelling present.      Right shoulder: She exhibits decreased range of motion and tenderness (anterior).      Right knee: She exhibits decreased range of motion. Tenderness found.      Comments: Crepitus R knee  Tender points BL hip and BL shoulder   Lymphadenopathy:      Cervical: No cervical adenopathy.   Neurological:      Mental Status: She is alert.         Assessment:       1. Essential hypertension    2. Cervical radiculopathy    3. Morbid obesity    4. Hiatal hernia with GERD    5. Annual physical " exam    6. Snoring    7. Anxiety    8. Fibromyalgia    9. Back pain, unspecified back location, unspecified back pain laterality, unspecified chronicity    10. Chronic pain of both knees        Plan:       Yane was seen today for foot pain, arm pain, knee pain, gastroesophageal reflux, medication refill and obesity.    Diagnoses and all orders for this visit:    Essential hypertension  Within goal at clinic visit. Refill meds and continue to monitor. Low Na diet counseling.   -     Comprehensive metabolic panel; Future  -     lisinopriL-hydrochlorothiazide (PRINZIDE,ZESTORETIC) 20-12.5 mg per tablet; Take 1 tablet by mouth once daily.  -     Ambulatory referral/consult to Obstetrics / Gynecology; Future    Cervical radiculopathy  Associated weakness and anterior pain. Refer to orthopedics for potential imaging.   -     Ambulatory referral/consult to Orthopedics; Future  -     Vitamin B12; Future  -     Folate; Future    Morbid obesity  Pt encouraged to follow-up with [facility] bariat[facility]. Contact info of MA with whom she was in touch with recently provided.   -     Hemoglobin A1C; Future  -     Comprehensive metabolic panel; Future  -     Home Sleep Studies; Future  -     Ambulatory referral/consult to Physical/Occupational Therapy; Future  -     TSH; Future    Hiatal hernia with GERD  Initiate 20mg Protonix daily and assess control at next visit.   -     pantoprazole (PROTONIX) 20 MG tablet; Take 1 tablet (20 mg total) by mouth once daily.  -     Vitamin B12; Future  -     Folate; Future    Annual physical exam  Overall, physical deconditioning and will require coordination of care.   -     HIV 1/2 Ag/Ab (4th Gen); Future  -     CBC auto differential; Future  -     Vitamin B12; Future  -     Folate; Future    Snoring  Concern for underlying DEANGELO; multiple risk factors.   -     Home Sleep Studies; Future    Anxiety/BP 1/MDD  Pt reports prior history including being on medications. She is unable to recall exact  medications.   -     Ambulatory referral/consult to Psychiatry; Future  -     TSH; Future    Fibromyalgia  Reported diagnosis. She reports previously being on gabapentin? Re-assess.  -     Ambulatory referral/consult to Rheumatology; Future  -     TSH; Future    Back pain, unspecified back location, unspecified back pain laterality, unspecified chronicity  Chronic pain of both knees  Chronic, likely due to OA.  -     Ambulatory referral/consult to Rheumatology; Future  -     Ambulatory referral/consult to Orthopedics; Future  -     Ambulatory referral/consult to Physical/Occupational Therapy; Future    Pt seen and d/w Dr. Avelar.     Return to clinic in about 6 months to continue coordination of care. Consider medications to assist with associated pain I.e. SNRI, tylenol.    Elisa Ceballos MD  PGY3  Internal Medicine

## 2020-07-03 LAB — HIV 1+2 AB+HIV1 P24 AG SERPL QL IA: NEGATIVE

## 2020-07-06 ENCOUNTER — TELEPHONE (OUTPATIENT)
Dept: INTERNAL MEDICINE | Facility: CLINIC | Age: 40
End: 2020-07-06

## 2020-07-06 DIAGNOSIS — M25.561 CHRONIC PAIN OF BOTH KNEES: Primary | ICD-10-CM

## 2020-07-06 DIAGNOSIS — G89.29 CHRONIC PAIN OF BOTH KNEES: Primary | ICD-10-CM

## 2020-07-06 DIAGNOSIS — M25.562 CHRONIC PAIN OF BOTH KNEES: Primary | ICD-10-CM

## 2020-07-06 NOTE — TELEPHONE ENCOUNTER
Discussed lab results. Repeat  A1c in one year as pre-diabetic (A1c 6.0).    Consider metformin at next visit as preventative measure at next visit.   Lifestyle modification (ie, 5 to 10% weight loss and moderate-intensity physical activity, approximately 30 min/day) discussed.

## 2020-07-07 ENCOUNTER — TELEPHONE (OUTPATIENT)
Dept: SLEEP MEDICINE | Facility: OTHER | Age: 40
End: 2020-07-07

## 2020-07-08 ENCOUNTER — TELEPHONE (OUTPATIENT)
Dept: SLEEP MEDICINE | Facility: OTHER | Age: 40
End: 2020-07-08

## 2020-07-12 ENCOUNTER — PATIENT MESSAGE (OUTPATIENT)
Dept: INTERNAL MEDICINE | Facility: CLINIC | Age: 40
End: 2020-07-12

## 2020-07-12 DIAGNOSIS — M79.673 PAIN OF FOOT, UNSPECIFIED LATERALITY: Primary | ICD-10-CM

## 2020-07-16 ENCOUNTER — TELEPHONE (OUTPATIENT)
Dept: INTERNAL MEDICINE | Facility: CLINIC | Age: 40
End: 2020-07-16

## 2020-07-16 NOTE — TELEPHONE ENCOUNTER
"Pt called regarding message:    "Hello I went to Urgent Care on Friday and my results came back positive for COVID-19 today. What should I do? Can you request the results from [facility] in [place] to check the results and see if they are right? Call me when you find out please at [phone]."    Pt reports onset of HA, cough, chills, weakness, and diarrhea Tuesday prior to going to Urgent Care.    Today, she notes minimal dry cough and some pleuritic chest pain. Diarrhea has improved. Appetite intact and no loss of smell. Denies fevers.     Pt lives with several family members.   Pt advised to quarantine per CDC guidelines and isolate at home as much as possible.   Also advised regarding sanitation and cleaning at home.  Information provided via portal.       "

## 2020-07-21 DIAGNOSIS — M25.562 CHRONIC PAIN OF BOTH KNEES: Primary | ICD-10-CM

## 2020-07-21 DIAGNOSIS — M54.9 BACK PAIN, UNSPECIFIED BACK LOCATION, UNSPECIFIED BACK PAIN LATERALITY, UNSPECIFIED CHRONICITY: ICD-10-CM

## 2020-07-21 DIAGNOSIS — M25.561 CHRONIC PAIN OF BOTH KNEES: Primary | ICD-10-CM

## 2020-07-21 DIAGNOSIS — G89.29 CHRONIC PAIN OF BOTH KNEES: Primary | ICD-10-CM

## 2020-07-27 NOTE — PROGRESS NOTES
Subjective:      Patient ID: Yane Dobbins is a 40 y.o. female.    Chief Complaint: Ankle Pain, Foot Pain, Nail Problem, Ingrown Toenail, and Toe Injury (L foot great toe, March 2020)      HPI:  Yane is a 40 y.o. female who presents to the clinic complaining of heel pain in both feet, especially with the first step in the morning. The pain is described as Aching, Throbbing, Grabbing, Tight, Sharp and Shooting. The onset of the pain was gradual and has worsened over the past several months. Yane rates the pain as 9/10. She denies a history of trauma. Prior treatments include none.  Patient breaking down crying, stating she cannot working more due to pain in her feet and ankles, which she states is due to her weight.  She begs for help to alleviate pain as she wants to walk to aid in weight loss.  She expresses being depressed due to her weight but denies any suicidal ideations.  Patient denies any other pedal complaints at this time.     PCP:   Elisa Ceballos MD  Date last seen: 7/2/20    Review of Systems   Constitutional: Negative for appetite change, fever, chills, fatigue and unexpected weight change.   Respiratory: Negative for cough, wheezing, and shortness of breath.   Cardiovascular: Negative for chest pain, claudication, cyanosis.  Positive for leg swelling.  Endocrine:  Negative for intolerance to cold, intolerance to heat, polydipsia, polyphagia, and polyuria.    Gastrointestinal: Negative for nausea, vomiting, diarrhea, and constipation.   Musculoskeletal: Negative for back pain, arthritis, joint pain, joint swelling.  Positive for myalgias, stiffness.   Skin: Negative for discoloration, rash, itching, poor wound healing, suspicious lesion, and unusual hair distribution.  Positive for nail bed changes.  Neurological: Negative for loss of balance, sensory change, paresthesias, and numbness.   Hematological: Negative for adenopathy, bleeding, and bruising easily.   Psychiatric/Behavioral: The  patient is not nervous/anxious.  Negative for altered mental status.  Positive for distress, depression.    Hemoglobin A1C   Date Value Ref Range Status   2020 6.0 (H) 4.0 - 5.6 % Final     Comment:     ADA Screening Guidelines:  5.7-6.4%  Consistent with prediabetes  >or=6.5%  Consistent with diabetes  High levels of fetal hemoglobin interfere with the HbA1C  assay. Heterozygous hemoglobin variants (HbS, HgC, etc)do  not significantly interfere with this assay.   However, presence of multiple variants may affect accuracy.     10/27/2017 5.6 4.0 - 5.6 % Final     Comment:     According to ADA guidelines, hemoglobin A1c <7.0% represents  optimal control in non-pregnant diabetic patients. Different  metrics may apply to specific patient populations.   Standards of Medical Care in Diabetes-2016.  For the purpose of screening for the presence of diabetes:  <5.7%     Consistent with the absence of diabetes  5.7-6.4%  Consistent with increasing risk for diabetes   (prediabetes)  >or=6.5%  Consistent with diabetes  Currently, no consensus exists for use of hemoglobin A1c  for diagnosis of diabetes for children.  This Hemoglobin A1c assay has significant interference with fetal   hemoglobin   (HbF). The results are invalid for patients with abnormal amounts of   HbF,   including those with known Hereditary Persistence   of Fetal Hemoglobin. Heterozygous hemoglobin variants (HbAS, HbAC,   HbAD, HbAE, HbA2) do not significantly interfere with this assay;   however, presence of multiple variants in a sample may impact the %   interference.         Past Medical History:   Diagnosis Date    Anxiety     Depression     GERD (gastroesophageal reflux disease)     H/O fibromyalgia     Obesity     TIA (transient ischemic attack)     unclear if diagnosis established per chart review of associated admission    Toxemia in pregnancy      Past Surgical History:   Procedure Laterality Date     SECTION      x2     "CHOLECYSTECTOMY      FOOT SURGERY       Family History   Problem Relation Age of Onset    Hypertension Mother     Diabetes Mother     Diabetes Father     Hypertension Father     Hypertension Brother     Breast cancer Maternal Grandmother     Stomach cancer Maternal Grandfather     Colon cancer Paternal Grandmother      Social History     Socioeconomic History    Marital status: Single     Spouse name: Not on file    Number of children: Not on file    Years of education: Not on file    Highest education level: Not on file   Occupational History    Not on file   Social Needs    Financial resource strain: Not on file    Food insecurity     Worry: Not on file     Inability: Not on file    Transportation needs     Medical: Not on file     Non-medical: Not on file   Tobacco Use    Smoking status: Passive Smoke Exposure - Never Smoker    Smokeless tobacco: Never Used    Tobacco comment: in high school   Substance and Sexual Activity    Alcohol use: Yes     Comment: red wine 2x week    Drug use: No    Sexual activity: Never   Lifestyle    Physical activity     Days per week: Not on file     Minutes per session: Not on file    Stress: Only a little   Relationships    Social connections     Talks on phone: Not on file     Gets together: Not on file     Attends Gnosticist service: Not on file     Active member of club or organization: Not on file     Attends meetings of clubs or organizations: Not on file     Relationship status: Not on file   Other Topics Concern    Not on file   Social History Narrative    Live w/ children            Objective:        Ht 5' 3" (1.6 m)   Wt (!) 166 kg (366 lb)   BMI 64.83 kg/m²     Physical Exam   Constitutional: Patient is oriented to person, place, and time. Patient appears well-developed and well-nourished.  Distressed.     Psychiatric: Patient has depressed mood and affect. Patient's speech is normal and behavior is normal. Judgment is normal. Cognition and " memory are normal.     Bilateral pedal exam was performed today.  Vascular: Pedal pulses palpable 2/4 DP & PT.  CFT is > 3 seconds to the hallux.  Skin temperature is warm to warm proximal tibia to distal toes without localized increase in calor noted.  No erythema, edema, or ecchymosis noted to the foot or ankle.  Hair growth present distally to the LE.     Musculoskeletal: Ankle joint ROM is decreased. Subtalar joint ROM is decreased.  Midtarsal joint ROM is decreased.  1st ray ROM is decreased.  1st MTPJ ROM is decreased.  Ankle joint dorsiflexion is restricted with the knee extended and flexed per Silfverskiold exam.  Muscle strength is 5/5 for all LE muscle groups tested.    Neurological:  Epicritic sensation is grossly Intact to the foot.   Achilles DTR and Chaddock STR are Intact to bilateral lower extremities.   Negative Babinski sign bilateral lower extremities.  Negative clonus sign bilateral lower extremities.  Tenderness to palpation noted to the plantar medial arch at the insertion of the medial band of the plantar fascia on the calcaneus and to palpation of the right hallux toenail distally.    Dermatological: Toenails 1-5 bilateral are WNL in length, color, and thickness with irregularity noted to the distal aspect of the right hallux toenail.  Webspaces 1-4 bilateral are clean, dry, and intact.  Skin turgor is increased.  No dry, flaky skin noted to the LE.  No open wounds or suspicious pigmented lesions appreciable to the foot or ankle.    Nursing note and vitals reviewed.        Assessment:       Encounter Diagnoses   Name Primary?    Pain in both feet Yes    Equinus deformity of both feet     Plantar fasciitis     Neuritis of left foot     Onychodystrophy     Exostosis of toe     Morbid obesity with body mass index (BMI) of 60.0 to 69.9 in adult          Plan:       Yane was seen today for ankle pain, foot pain, nail problem, ingrown toenail and toe injury.    Diagnoses and all orders  for this visit:    Pain in both feet  -     Ambulatory referral/consult to Podiatry  -     X-Ray Foot Complete Bilateral; Future    Equinus deformity of both feet  -     Ambulatory referral/consult to Physical/Occupational Therapy; Future    Plantar fasciitis    Neuritis of left foot    Onychodystrophy    Exostosis of toe    Morbid obesity with body mass index (BMI) of 60.0 to 69.9 in adult      I counseled the patient on her conditions, their implications and medical management.    - Ordered and reviewed xrays with patient - no calc spur but right hallux distal phalanx exostosis is present.    - Educated patient on proper foot care and supportive/accommodative shoe gear.      - Educated patient on PRICE therapy and Achilles/plantar fascial stretches with demonstration of stretches and stretching technique handout given to patient.    - Patient defers any oral or injectable steroids at this time.    - Referred patient to physical therapy immediately as she has difficulty reaching her feet due to body habitus and will need help performing stretching exercises.  Patient reminded of the importance of good nutrition, exercise, and blood sugar control to help prevent podiatric complications of obesity.  Concerned patient's health will severely inhibit her ability to recover within 4-6 week timeframe with home conservative therapy alone.    - Recommended patient seek help via nutritionist/dietitian, psychotherapist, and weight loss clinic to aid her in achieving her weight loss goals and improving her mental health.  She states that her primary care physician has referred her to these providers, but she has not seen them yet.    Patient was given the following recommendations and instructions:  Patient Instructions   - Wear supportive shoes such as sneakers with arch supports.    - Look for Superfeet, SofSole, or Powerstep arch supports.    - Rest/reduce ambulation to necessary activities of daily living.    - Ice heel  for no more than 20 minutes/per hour.    - Elevate foot as much as possible throughout the day.    - Perform Achilles/plantar fascia stretches as much as possible throughout the day.    - Apply lidocaine cream or Voltaren gel to heel as needed for pain relief.    - Take medications as prescribed.    - Notify clinic if pain worsens or fails to improve.          Sally Abad DPM        Dictation was performed using M*Modal Fluency.  Transcription errors may be present.

## 2020-07-28 ENCOUNTER — TELEPHONE (OUTPATIENT)
Dept: SLEEP MEDICINE | Facility: OTHER | Age: 40
End: 2020-07-28

## 2020-07-29 ENCOUNTER — HOSPITAL ENCOUNTER (OUTPATIENT)
Dept: SLEEP MEDICINE | Facility: OTHER | Age: 40
Discharge: HOME | End: 2020-07-29
Attending: STUDENT IN AN ORGANIZED HEALTH CARE EDUCATION/TRAINING PROGRAM
Payer: MEDICAID

## 2020-07-29 DIAGNOSIS — E66.01 MORBID OBESITY: ICD-10-CM

## 2020-07-29 DIAGNOSIS — R06.83 SNORING: ICD-10-CM

## 2020-07-29 DIAGNOSIS — G47.33 OSA (OBSTRUCTIVE SLEEP APNEA): Primary | ICD-10-CM

## 2020-07-29 PROCEDURE — 95800 SLP STDY UNATTENDED: CPT | Mod: 52

## 2020-07-29 PROCEDURE — 95800 SLP STDY UNATTENDED: CPT | Mod: 26,,, | Performed by: INTERNAL MEDICINE

## 2020-07-29 PROCEDURE — 95800 PR SLEEP STUDY, UNATTENDED, RECORD HEART RATE/O2 SAT/RESP ANAL/SLEEP TIME: ICD-10-PCS | Mod: 26,,, | Performed by: INTERNAL MEDICINE

## 2020-07-30 ENCOUNTER — OFFICE VISIT (OUTPATIENT)
Dept: PODIATRY | Facility: CLINIC | Age: 40
End: 2020-07-30
Payer: MEDICAID

## 2020-07-30 ENCOUNTER — HOSPITAL ENCOUNTER (OUTPATIENT)
Dept: RADIOLOGY | Facility: HOSPITAL | Age: 40
Discharge: HOME | End: 2020-07-30
Attending: PODIATRIST
Payer: MEDICAID

## 2020-07-30 VITALS — HEIGHT: 63 IN | BODY MASS INDEX: 51.91 KG/M2 | WEIGHT: 293 LBS

## 2020-07-30 DIAGNOSIS — E66.01 MORBID OBESITY WITH BODY MASS INDEX (BMI) OF 60.0 TO 69.9 IN ADULT: ICD-10-CM

## 2020-07-30 DIAGNOSIS — M21.6X2 EQUINUS DEFORMITY OF BOTH FEET: ICD-10-CM

## 2020-07-30 DIAGNOSIS — G57.92 NEURITIS OF LEFT FOOT: ICD-10-CM

## 2020-07-30 DIAGNOSIS — L60.3 ONYCHODYSTROPHY: ICD-10-CM

## 2020-07-30 DIAGNOSIS — M21.6X1 EQUINUS DEFORMITY OF BOTH FEET: ICD-10-CM

## 2020-07-30 DIAGNOSIS — M89.8X7 EXOSTOSIS OF TOE: ICD-10-CM

## 2020-07-30 DIAGNOSIS — M79.671 PAIN IN BOTH FEET: Primary | ICD-10-CM

## 2020-07-30 DIAGNOSIS — M72.2 PLANTAR FASCIITIS: ICD-10-CM

## 2020-07-30 DIAGNOSIS — M79.673 PAIN OF FOOT, UNSPECIFIED LATERALITY: ICD-10-CM

## 2020-07-30 DIAGNOSIS — M79.672 PAIN IN BOTH FEET: Primary | ICD-10-CM

## 2020-07-30 PROCEDURE — 73630 X-RAY EXAM OF FOOT: CPT | Mod: TC,50,PO

## 2020-07-30 PROCEDURE — 99999 PR PBB SHADOW E&M-EST. PATIENT-LVL IV: ICD-10-PCS | Mod: PBBFAC,,, | Performed by: PODIATRIST

## 2020-07-30 PROCEDURE — 99204 OFFICE O/P NEW MOD 45 MIN: CPT | Mod: S$PBB,,, | Performed by: PODIATRIST

## 2020-07-30 PROCEDURE — 99204 PR OFFICE/OUTPT VISIT, NEW, LEVL IV, 45-59 MIN: ICD-10-PCS | Mod: S$PBB,,, | Performed by: PODIATRIST

## 2020-07-30 PROCEDURE — 73630 XR FOOT COMPLETE 3 VIEW BILATERAL: ICD-10-PCS | Mod: 26,50,, | Performed by: RADIOLOGY

## 2020-07-30 PROCEDURE — 99999 PR PBB SHADOW E&M-EST. PATIENT-LVL IV: CPT | Mod: PBBFAC,,, | Performed by: PODIATRIST

## 2020-07-30 PROCEDURE — 99214 OFFICE O/P EST MOD 30 MIN: CPT | Mod: PBBFAC,25,PN | Performed by: PODIATRIST

## 2020-07-30 PROCEDURE — 73630 X-RAY EXAM OF FOOT: CPT | Mod: 26,50,, | Performed by: RADIOLOGY

## 2020-07-30 NOTE — PATIENT INSTRUCTIONS
- Wear supportive shoes such as sneakers with arch supports.    - Look for Superfeet, SofSole, or Powerstep arch supports.    - Rest/reduce ambulation to necessary activities of daily living.    - Ice heel for no more than 20 minutes/per hour.    - Elevate foot as much as possible throughout the day.    - Perform Achilles/plantar fascia stretches as much as possible throughout the day.    - Apply lidocaine cream or Voltaren gel to heel as needed for pain relief.    - Take medications as prescribed.    - Notify clinic if pain worsens or fails to improve.

## 2020-08-03 NOTE — PROCEDURES
"The sleep study that you ordered is complete.  You have ordered sleep LAB services to perform the sleep study for Yane Dobbins.     Please find Sleep Study result in  the "Media tab" of Chart Review menu.         You can look  for the report in the  Media as a procedure document type.    As the ordering provider, you are responsible for reviewing the results and implementing a treatment plan with your patient.     If you need a Sleep Medicine provider to explain the sleep study findings and arrange treatment for the patient, please refer patient for consultation to our Sleep Clinic via [facility] with Ambulatory Consult Sleep.     To do that please place an order for an  "Ambulatory Consult Sleep" - it will go to our clinic work queue for our Medical Assistant to contact the patient for an appointment.     For any questions, please contact our clinic staff at [phone] to talk to clinical staff.     "

## 2020-08-04 ENCOUNTER — OFFICE VISIT (OUTPATIENT)
Dept: NEUROSURGERY | Facility: CLINIC | Age: 40
End: 2020-08-04
Payer: MEDICAID

## 2020-08-04 ENCOUNTER — OFFICE VISIT (OUTPATIENT)
Dept: ORTHOPEDICS | Facility: CLINIC | Age: 40
End: 2020-08-04
Payer: MEDICAID

## 2020-08-04 ENCOUNTER — TELEPHONE (OUTPATIENT)
Dept: PHYSICAL MEDICINE AND REHAB | Facility: CLINIC | Age: 40
End: 2020-08-04

## 2020-08-04 ENCOUNTER — HOSPITAL ENCOUNTER (OUTPATIENT)
Dept: RADIOLOGY | Facility: HOSPITAL | Age: 40
Discharge: HOME | End: 2020-08-04
Attending: PHYSICIAN ASSISTANT
Payer: MEDICAID

## 2020-08-04 VITALS
HEIGHT: 63 IN | BODY MASS INDEX: 51.91 KG/M2 | SYSTOLIC BLOOD PRESSURE: 223 MMHG | RESPIRATION RATE: 18 BRPM | WEIGHT: 293 LBS | DIASTOLIC BLOOD PRESSURE: 140 MMHG

## 2020-08-04 VITALS
WEIGHT: 293 LBS | SYSTOLIC BLOOD PRESSURE: 150 MMHG | HEART RATE: 83 BPM | HEIGHT: 63 IN | BODY MASS INDEX: 51.91 KG/M2 | DIASTOLIC BLOOD PRESSURE: 89 MMHG

## 2020-08-04 DIAGNOSIS — M94.269 CHONDROMALACIA OF KNEE, UNSPECIFIED LATERALITY: ICD-10-CM

## 2020-08-04 DIAGNOSIS — M54.9 BACK PAIN, UNSPECIFIED BACK LOCATION, UNSPECIFIED BACK PAIN LATERALITY, UNSPECIFIED CHRONICITY: ICD-10-CM

## 2020-08-04 DIAGNOSIS — M25.562 CHRONIC PAIN OF BOTH KNEES: Primary | ICD-10-CM

## 2020-08-04 DIAGNOSIS — M76.51 PATELLAR TENDINITIS, RIGHT KNEE: ICD-10-CM

## 2020-08-04 DIAGNOSIS — M54.9 DORSALGIA, UNSPECIFIED: ICD-10-CM

## 2020-08-04 DIAGNOSIS — M25.561 CHRONIC PAIN OF BOTH KNEES: ICD-10-CM

## 2020-08-04 DIAGNOSIS — M25.561 CHRONIC PAIN OF BOTH KNEES: Primary | ICD-10-CM

## 2020-08-04 DIAGNOSIS — M54.12 RADICULOPATHY, CERVICAL REGION: Primary | ICD-10-CM

## 2020-08-04 DIAGNOSIS — G89.29 CHRONIC PAIN OF BOTH KNEES: Primary | ICD-10-CM

## 2020-08-04 DIAGNOSIS — M25.562 CHRONIC PAIN OF BOTH KNEES: ICD-10-CM

## 2020-08-04 DIAGNOSIS — G89.29 CHRONIC PAIN OF BOTH KNEES: ICD-10-CM

## 2020-08-04 PROCEDURE — 99999 PR PBB SHADOW E&M-EST. PATIENT-LVL IV: CPT | Mod: PBBFAC,,, | Performed by: PHYSICIAN ASSISTANT

## 2020-08-04 PROCEDURE — 99999 PR PBB SHADOW E&M-EST. PATIENT-LVL III: ICD-10-PCS | Mod: PBBFAC,,, | Performed by: PHYSICIAN ASSISTANT

## 2020-08-04 PROCEDURE — 73564 X-RAY EXAM KNEE 4 OR MORE: CPT | Mod: TC,50

## 2020-08-04 PROCEDURE — 99213 OFFICE O/P EST LOW 20 MIN: CPT | Mod: PBBFAC,25,27 | Performed by: PHYSICIAN ASSISTANT

## 2020-08-04 PROCEDURE — 73564 X-RAY EXAM KNEE 4 OR MORE: CPT | Mod: 26,50,, | Performed by: RADIOLOGY

## 2020-08-04 PROCEDURE — 73564 XR KNEE ORTHO BILAT WITH FLEXION: ICD-10-PCS | Mod: 26,50,, | Performed by: RADIOLOGY

## 2020-08-04 PROCEDURE — 99214 OFFICE O/P EST MOD 30 MIN: CPT | Mod: PBBFAC,25,PO | Performed by: PHYSICIAN ASSISTANT

## 2020-08-04 PROCEDURE — 99999 PR PBB SHADOW E&M-EST. PATIENT-LVL IV: ICD-10-PCS | Mod: PBBFAC,,, | Performed by: PHYSICIAN ASSISTANT

## 2020-08-04 PROCEDURE — 99202 PR OFFICE/OUTPT VISIT, NEW, LEVL II, 15-29 MIN: ICD-10-PCS | Mod: S$PBB,,, | Performed by: PHYSICIAN ASSISTANT

## 2020-08-04 PROCEDURE — 99204 PR OFFICE/OUTPT VISIT, NEW, LEVL IV, 45-59 MIN: ICD-10-PCS | Mod: S$PBB,,, | Performed by: PHYSICIAN ASSISTANT

## 2020-08-04 PROCEDURE — 99202 OFFICE O/P NEW SF 15 MIN: CPT | Mod: S$PBB,,, | Performed by: PHYSICIAN ASSISTANT

## 2020-08-04 PROCEDURE — 99999 PR PBB SHADOW E&M-EST. PATIENT-LVL III: CPT | Mod: PBBFAC,,, | Performed by: PHYSICIAN ASSISTANT

## 2020-08-04 PROCEDURE — 99204 OFFICE O/P NEW MOD 45 MIN: CPT | Mod: S$PBB,,, | Performed by: PHYSICIAN ASSISTANT

## 2020-08-04 NOTE — LETTER
August 4, 2020      Elisa Ceballos MD  [address] [facility] [address]           [facility]  [address] [place] [id number]  Phone: [phone]  Fax: [phone]          Patient: Yane Dobbins   MR Number: [id number]   YOB: 1980   Date of Visit: 8/4/2020       Dear Dr. Elisa Ceballos:    Thank you for referring Yane Dobbins to me for evaluation. Attached you will find relevant portions of my assessment and plan of care.    If you have questions, please do not hesitate to call me. I look forward to following Yane Dobbins along with you.    Sincerely,    Tracey Piña PA-C    Enclosure  CC:  No Recipients    If you would like to receive this communication electronically, please contact [email or web address] or [phone] to request more information on [employer] access.    For providers and/or their staff who would like to refer a patient to [facility], please contact us through our one-stop-shop provider referral line, Rabia Dupree, at [phone].    If you feel you have received this communication in error or would no longer like to receive these types of communications, please e-mail [email or web address]

## 2020-08-04 NOTE — PROGRESS NOTES
Subjective:      Patient ID: Yane Dobbins is a 40 y.o. female.    Chief Complaint: Pain of the Right Knee and Pain of the Left Knee      HPI: Yane Dobbins  is a 40 y.o. female who c/o Pain of the Right Knee and Pain of the Left Knee   for duration of several years.  She has gotten worse since this past November.  She says she has had 2 falls in the last year.  She says in November the right knee gave out on her and she landed on the right side.  In January she is not really sure what happened.  She attributes both of these falls to worsening bilateral knee pain.  She is under the care of primary care for this as well.  She actually has an appointment to be evaluated for gastric sleeve next week.  She is unable to take oral NSAIDs due to GI side effects.    Right knee is worse than the left.  6/10 in severity.  Quality is aching and burning.  She says the pain stays mainly in the knees.  She does not describe any radiating pain.  She complains of painful popping on the right side.  Alleviating factors include rest and elevation.  Aggravating factors include prolonged weight-bearing, getting up after prolonged inactivity as well as at nighttime when she tries to sleep.    Past Medical History:   Diagnosis Date    Anxiety     Depression     GERD (gastroesophageal reflux disease)     H/O fibromyalgia     Obesity     TIA (transient ischemic attack)     unclear if diagnosis established per chart review of associated admission    Toxemia in pregnancy      Past Surgical History:   Procedure Laterality Date     SECTION      x2    CHOLECYSTECTOMY      FOOT SURGERY       Family History   Problem Relation Age of Onset    Hypertension Mother     Diabetes Mother     Diabetes Father     Hypertension Father     Hypertension Brother     Breast cancer Maternal Grandmother     Stomach cancer Maternal Grandfather     Colon cancer Paternal Grandmother      Social History     Socioeconomic History     Marital status: Single     Spouse name: Not on file    Number of children: Not on file    Years of education: Not on file    Highest education level: Not on file   Occupational History    Not on file   Social Needs    Financial resource strain: Not on file    Food insecurity     Worry: Not on file     Inability: Not on file    Transportation needs     Medical: Not on file     Non-medical: Not on file   Tobacco Use    Smoking status: Passive Smoke Exposure - Never Smoker    Smokeless tobacco: Never Used    Tobacco comment: in high school   Substance and Sexual Activity    Alcohol use: Yes     Comment: red wine 2x week    Drug use: No    Sexual activity: Never   Lifestyle    Physical activity     Days per week: Not on file     Minutes per session: Not on file    Stress: Only a little   Relationships    Social connections     Talks on phone: Not on file     Gets together: Not on file     Attends Gnosticist service: Not on file     Active member of club or organization: Not on file     Attends meetings of clubs or organizations: Not on file     Relationship status: Not on file   Other Topics Concern    Not on file   Social History Narrative    Live w/ children      Medication List with Changes/Refills   Current Medications    LISINOPRIL-HYDROCHLOROTHIAZIDE (PRINZIDE,ZESTORETIC) 20-12.5 MG PER TABLET    Take 1 tablet by mouth once daily.    PANTOPRAZOLE (PROTONIX) 20 MG TABLET    Take 1 tablet (20 mg total) by mouth once daily.     Review of patient's allergies indicates:   Allergen Reactions    Penicillins Diarrhea, Itching and Rash       Review of Systems   Constitution: Negative for fever.   Cardiovascular: Negative for chest pain.   Respiratory: Negative for cough and shortness of breath.    Skin: Negative for rash.   Musculoskeletal: Negative for joint pain, joint swelling and stiffness.   Gastrointestinal: Negative for heartburn.   Neurological: Negative for headaches and numbness.          Objective:        General    Nursing note and vitals reviewed.  Constitutional: She is oriented to person, place, and time. She appears well-developed and well-nourished.   HENT:   Head: Normocephalic and atraumatic.   Eyes: EOM are normal.   Cardiovascular: Normal rate and regular rhythm.    Pulmonary/Chest: Effort normal.   Abdominal: Soft.   Neurological: She is alert and oriented to person, place, and time.   Psychiatric: She has a normal mood and affect. Her behavior is normal.           Right Knee Exam     Inspection   Erythema: absent  Swelling: absent  Effusion: absent  Deformity: absent  Bruising: absent    Tenderness   The patient is tender to palpation of the medial joint line, condyle, lateral joint line, pes anserinus, tibial tubercle, patella, patellar tendon and medial hamstring.    Range of Motion   Extension:  5 (Pain with resisted extension) abnormal   Flexion: abnormal Right knee flexion: Skin to skin flexion.     Tests   Meniscus   Nicole:  Medial - negative Lateral - negative  Ligament Examination Lachman: normal (-1 to 2mm) PCL-Posterior Drawer: normal (0 to 2mm)     MCL - Valgus: normal (0 to 2mm)  LCL - Varus: normal  Patella   Patellar apprehension: negative  Passive Patellar Tilt: neutral  Patellar Grind: positive    Other   Meniscal Cyst: absent  Popliteal (Baker's) Cyst: absent  Sensation: normal    Left Knee Exam     Inspection   Erythema: absent  Swelling: absent  Effusion: absent  Deformity: absent  Bruising: absent    Tenderness   The patient tender to palpation of the medial joint line and condyle.    Range of Motion   Extension: normal   Flexion: abnormal Left knee flexion: Skin to skin flexion.     Tests   Meniscus   Nicole:  Medial - negative Lateral - negative  Stability Lachman: normal (-1 to 2mm) PCL-Posterior Drawer: normal (0 to 2mm)  MCL - Valgus: normal (0 to 2mm)  LCL - Varus: normal (0 to 2mm)  Patella   Patellar apprehension: negative  Passive Patellar Tilt:  neutral  Patellar Grind: positive    Other   Meniscal Cyst: absent  Popliteal (Baker's) Cyst: absent  Sensation: normal    Muscle Strength   Right Lower Extremity   Quadriceps:  4/5   Hamstrin/5   Left Lower Extremity   Quadriceps:  5/5   Hamstrin/5     Vascular Exam       Edema  Right Lower Leg: absent  Left Lower Leg: absent              Xray images and report were reviewed today.  I agree with the radiologist's interpretation.    X-ray Knee Ortho Bilateral with Flexion  Narrative: EXAMINATION:  XR KNEE ORTHO BILAT WITH FLEXION    CLINICAL HISTORY:  Pain in right knee    TECHNIQUE:  AP standing of both knees, PA flexion standing views of both knees, and Merchant views of both knees were performed.  Lateral views of both knees were also performed.    COMPARISON  None    FINDINGS:  The joint spaces of all 3 compartments of both knees are well maintained.  No joint effusions are suggested.  No acute fracture or dislocation.  Impression: 1.  As above    Electronically signed by: Osmel Jaffe DO  Date:    2020  Time:    14:54        Assessment:       Encounter Diagnoses   Name Primary?    Chronic pain of both knees Yes    Chondromalacia of knee, unspecified laterality     Patellar tendinitis, right knee           Plan:       Yane was seen today for pain and pain.    Diagnoses and all orders for this visit:    Chronic pain of both knees  -     Ambulatory referral/consult to Physical/Occupational Therapy; Future    Chondromalacia of knee, unspecified laterality  -     Ambulatory referral/consult to Physical/Occupational Therapy; Future    Patellar tendinitis, right knee  -     Ambulatory referral/consult to Physical/Occupational Therapy; Future        Yane Dobbins is a new pt who comes in today for the above problems.  She has very well-maintained joint spaces on x-ray.  We have discussed the importance of weight loss as it pertains to chronic knee pain.  I would recommend formal physical  therapy for both knees, but specifically for the right knee for patellar tendinitis.  We have briefly discussed doing a Medrol Dosepak.  She has declined that in addition to steroid injections because she does not want to do any more steroids.  She relates significant weight gain to prior oral steroids in the past.  She is not a candidate for oral NSAIDs.  I recommend Tylenol 650 mg every 6-8 hours as needed for pain.  I would also recommend Voltaren gel 2 g topically 3 times daily as needed.  She she had split the dose between the knees and or feet as needed.  Also gave her home exercise program today.  I think she would benefit from formal aquatic therapy.  If she does not improve, we can reconsider injection versus further diagnostic workup later down the road.  She verbalizes understanding and agrees.    Follow up in about 6 weeks (around 9/15/2020).          The patient understands, chooses and consents to this plan and accepts all   the risks which include but are not limited to the risks mentioned above.     Disclaimer: This note was prepared using a voice recognition system and is likely to have sound alike errors within the text.

## 2020-08-04 NOTE — PROGRESS NOTES
"Subjective:      Patient ID: Yane Dobbins is a 40 y.o. female.    Chief Complaint: Cervical Spine Pain (C-spine) (Cervical radiculopathy )    Ms. Dobbins is a pleasant 41 yo female who presents to the neurosurgery clinic with complaints of neck pain with radiation of pain, numbness, and weakness to the right arm.  She is also complaining of increased low back pain with radiation to the right leg.  She states she has been undergoing increasing neck pain with weakness to her right arm for several years.  She states she saw a therapist for several years that performed "shock" therapy on her which helped temporarily.  She states she is having difficulty using her right arm for everyday things.  Due to numbness and weakness, she experiences difficulty with combing hair and or using her hand.  She states she often times has to "shake her right hand" to wake it up.  She experiences dull heavy, burning, numb pain down the right arm.  She reports having experienced an TIA in  and .  She denies any history of spinal surgery, nicotine use, or incontinence.  She was very hypertensive on examination today and was counseled to go to the ER.  She reports daily SOB which she states is a chronic condition.  She states her low back pain with radiation to her right leg interferes with her ability to exercise or ambulate.  She is morbidly obese and is hoping to undergo a gastric sleeve in the future.  She denies any HA, CP, or N/V/D at this time. No imaging studies for review.    Past Medical History:   Diagnosis Date    Anxiety     Depression     GERD (gastroesophageal reflux disease)     H/O fibromyalgia     Obesity     TIA (transient ischemic attack)     unclear if diagnosis established per chart review of associated admission    Toxemia in pregnancy      Past Surgical History:   Procedure Laterality Date     SECTION      x2    CHOLECYSTECTOMY      FOOT SURGERY       Family History   Problem Relation Age of " Onset    Hypertension Mother     Diabetes Mother     Diabetes Father     Hypertension Father     Hypertension Brother     Breast cancer Maternal Grandmother     Stomach cancer Maternal Grandfather     Colon cancer Paternal Grandmother      Social History     Tobacco Use    Smoking status: Passive Smoke Exposure - Never Smoker    Smokeless tobacco: Never Used    Tobacco comment: in high school   Substance Use Topics    Alcohol use: Yes     Comment: red wine 2x week    Drug use: No     Current Outpatient Medications on File Prior to Visit   Medication Sig Dispense Refill    lisinopriL-hydrochlorothiazide (PRINZIDE,ZESTORETIC) 20-12.5 mg per tablet Take 1 tablet by mouth once daily. 90 tablet 3    pantoprazole (PROTONIX) 20 MG tablet Take 1 tablet (20 mg total) by mouth once daily. 30 tablet 1     No current facility-administered medications on file prior to visit.      Review of Systems   Constitutional: Positive for activity change (right leg pain and weakness in right arm). Negative for appetite change, chills, diaphoresis, fatigue, fever and unexpected weight change.   HENT: Negative for nasal congestion, ear discharge, facial swelling, hearing loss, postnasal drip, rhinorrhea, tinnitus and trouble swallowing.    Eyes: Negative for photophobia, pain, discharge, redness, itching and visual disturbance.   Respiratory: Positive for shortness of breath. Negative for apnea, cough, choking, chest tightness and wheezing.    Cardiovascular: Negative for chest pain, leg swelling and claudication.   Gastrointestinal: Negative for abdominal distention, abdominal pain, change in bowel habit, constipation, diarrhea, nausea, vomiting, fecal incontinence and change in bowel habit.   Endocrine: Negative for cold intolerance and heat intolerance.   Genitourinary: Negative for bladder incontinence, decreased urine volume, difficulty urinating, frequency, hematuria and urgency.   Musculoskeletal: Positive for back pain  and neck pain. Negative for arthralgias, gait problem, leg pain and neck stiffness.   Neurological: Negative for dizziness, vertigo, tremors, seizures, syncope, facial asymmetry, speech difficulty, weakness, light-headedness, numbness, headaches, disturbances in coordination, memory loss and coordination difficulties.   Psychiatric/Behavioral: Negative for behavioral problems, confusion, decreased concentration, hallucinations and sleep disturbance. The patient is not nervous/anxious.          Objective:     Vitals:    08/04/20 1314   BP: (!) 223/140   Resp: 18      Review of patient's allergies indicates:   Allergen Reactions    Penicillins Diarrhea, Itching and Rash      Body mass index is 64.01 kg/m².     Physical Exam:  Vitals reviewed.    Constitutional:   Patient is morbidly obese  Lungs: wheezing  Ears: no otorrhea  Nose: no rhinorrhea  Neck: no difficulty with swallowing, no lymphadenopathy palpated.  Patient was very hypertensive on exam. She was counseled to go to the ER     Eyes: Pupils are equal, round, and reactive to light. EOM are normal.     Cardiovascular: Normal rate.     Abdominal: Soft.     Skin: Skin displays no rash on trunk and no rash on extremities. Skin displays no lesions on trunk and no lesions on extremities.     Psych/Behavior: She is alert. She is oriented to person, place, and time. She has a normal mood and affect.     Musculoskeletal: Gait is normal.        Neck: Range of motion is limited. There is tenderness. Tenderness is located right posterior neck. Muscle strength is 4/5. Tone is abnormal.        Back: Range of motion is full. Tone is normal.        Right Upper Extremities: Range of motion is full. Tone is normal.        Left Upper Extremities: Range of motion is full. Tone is normal.       Right Lower Extremities: Range of motion is full. Tone is normal.        Left Lower Extremities: Range of motion is full. Tone is normal.      Comments: Negative straight leg raise  Pain  to palpation of posterior neck       Neurological:        Sensory: There is no sensory deficit in the trunk. There is no sensory deficit in the extremities.        DTRs: DTRs are DTRS NORMAL AND SYMMETRICnormal and symmetric.        Cranial nerves: Cranial nerve(s) II, III, IV, V, VI, VII, VIII, IX, X, XI and XII are intact.     Neurologic Exam     Mental Status   Oriented to person, place, and time.   Attention: normal. Concentration: normal.   Speech: speech is normal   Level of consciousness: alert  Normal comprehension.     Cranial Nerves   Cranial nerves II through XII intact.     CN III, IV, VI   Pupils are equal, round, and reactive to light.  Extraocular motions are normal.     CN VIII   CN VIII normal.     CN IX, X   CN IX normal.   CN X normal.     CN XI   CN XI normal.     CN XII   CN XII normal.     Motor Exam     Strength   Strength 5/5 except as noted.   Right deltoid: 4/5  Right biceps: 4/5  Right triceps: 4/5  Right wrist flexion: 4/5  Right wrist extension: 4/5    There are no imaging studies for review.  Assessment:     1. Radiculopathy, cervical region    2. Back pain, unspecified back location, unspecified back pain laterality, unspecified chronicity    3. Dorsalgia, unspecified      Plan:     Radiculopathy, cervical region  -     MRI Cervical Spine Without Contrast; Future; Expected date: 08/04/2020  -     Ambulatory referral/consult to Physical/Occupational Therapy; Future; Expected date: 08/11/2020  -     EMG W/ ULTRASOUND AND NERVE CONDUCTION TEST 1 Extremity; Future; Expected date: 08/05/2020    Back pain, unspecified back location, unspecified back pain laterality, unspecified chronicity  -     Ambulatory referral/consult to Neurosurgery    Dorsalgia, unspecified  -     MRI Lumbar Spine Without Contrast; Future; Expected date: 08/04/2020    Due to the patient's BP on arrival, she was counseled to go to the ER.  She reports she feels fine and does not believe the reading was accurate as it  was performed on her leg.  She is clearly weak on the right UE when compared to the left.  She will need to undergo MRI studies of her Cervical spine wo contrast and lumbar spine wo to assess nerve roots.  Due to numbness in right arm, will order a nerve conduction study. She will likely benefit from PT which will also be ordered for the patient.  She was encouraged to contact the clinic with any questions or concerns.  She will follow-up with my in 4 weeks pending MRI results and nerve conduction studies.      Thank you for the referral   Please call with any questions    Magui Lutz PA-C  Neurosurgery

## 2020-08-06 DIAGNOSIS — G47.33 MODERATE OBSTRUCTIVE SLEEP APNEA: Primary | ICD-10-CM

## 2020-08-07 ENCOUNTER — SSC ENCOUNTER (OUTPATIENT)
Dept: ADMINISTRATIVE | Facility: OTHER | Age: 40
End: 2020-08-07

## 2020-08-07 NOTE — PROGRESS NOTES
Please note the following patient's information was forwarded to the Medicaid ([place] Medicaid,  [employer], [employer], [facility], [facility], [facility]) Case Management office.    Please contact Outpatient Complex Care Management at ext [phone] with any questions.    Thank you,    Idalmis Hoffman, [facility]  Outpatient Case Mgmnt  [phone]

## 2020-08-09 PROBLEM — M89.8X7 EXOSTOSIS OF TOE: Status: ACTIVE | Noted: 2020-08-09

## 2020-08-09 PROBLEM — M21.6X1 EQUINUS DEFORMITY OF BOTH FEET: Status: ACTIVE | Noted: 2020-08-09

## 2020-08-09 PROBLEM — L60.3 ONYCHODYSTROPHY: Status: ACTIVE | Noted: 2020-08-09

## 2020-08-09 PROBLEM — M79.672 PAIN IN BOTH FEET: Status: ACTIVE | Noted: 2020-08-09

## 2020-08-09 PROBLEM — E66.01 MORBID OBESITY WITH BODY MASS INDEX (BMI) OF 60.0 TO 69.9 IN ADULT: Status: ACTIVE | Noted: 2020-08-09

## 2020-08-09 PROBLEM — M72.2 PLANTAR FASCIITIS: Status: ACTIVE | Noted: 2020-08-09

## 2020-08-09 PROBLEM — G57.92 NEURITIS OF LEFT FOOT: Status: ACTIVE | Noted: 2020-08-09

## 2020-08-09 PROBLEM — M21.6X2 EQUINUS DEFORMITY OF BOTH FEET: Status: ACTIVE | Noted: 2020-08-09

## 2020-08-09 PROBLEM — M79.671 PAIN IN BOTH FEET: Status: ACTIVE | Noted: 2020-08-09

## 2020-08-18 ENCOUNTER — OFFICE VISIT (OUTPATIENT)
Dept: INTERNAL MEDICINE | Facility: CLINIC | Age: 40
End: 2020-08-18
Payer: MEDICAID

## 2020-08-18 ENCOUNTER — PATIENT MESSAGE (OUTPATIENT)
Dept: INTERNAL MEDICINE | Facility: CLINIC | Age: 40
End: 2020-08-18

## 2020-08-18 DIAGNOSIS — M25.561 CHRONIC PAIN OF BOTH KNEES: ICD-10-CM

## 2020-08-18 DIAGNOSIS — M25.562 CHRONIC PAIN OF BOTH KNEES: ICD-10-CM

## 2020-08-18 DIAGNOSIS — M54.12 CERVICAL RADICULOPATHY: ICD-10-CM

## 2020-08-18 DIAGNOSIS — M54.9 BACK PAIN, UNSPECIFIED BACK LOCATION, UNSPECIFIED BACK PAIN LATERALITY, UNSPECIFIED CHRONICITY: ICD-10-CM

## 2020-08-18 DIAGNOSIS — F41.9 ANXIETY: ICD-10-CM

## 2020-08-18 DIAGNOSIS — G89.29 CHRONIC PAIN OF BOTH KNEES: ICD-10-CM

## 2020-08-18 DIAGNOSIS — R11.0 NAUSEA: Primary | ICD-10-CM

## 2020-08-18 PROCEDURE — 99214 PR OFFICE/OUTPT VISIT, EST, LEVL IV, 30-39 MIN: ICD-10-PCS | Mod: 95,,, | Performed by: STUDENT IN AN ORGANIZED HEALTH CARE EDUCATION/TRAINING PROGRAM

## 2020-08-18 PROCEDURE — 99214 OFFICE O/P EST MOD 30 MIN: CPT | Mod: 95,,, | Performed by: STUDENT IN AN ORGANIZED HEALTH CARE EDUCATION/TRAINING PROGRAM

## 2020-08-18 RX ORDER — ONDANSETRON 4 MG/1
4 TABLET, FILM COATED ORAL EVERY 8 HOURS PRN
Qty: 90 TABLET | Refills: 1 | Status: SHIPPED | OUTPATIENT
Start: 2020-08-18 | End: 2020-09-17

## 2020-08-18 RX ORDER — DULOXETIN HYDROCHLORIDE 30 MG/1
30 CAPSULE, DELAYED RELEASE ORAL DAILY
Qty: 30 CAPSULE | Refills: 11 | Status: SHIPPED | OUTPATIENT
Start: 2020-08-18 | End: 2020-09-14

## 2020-08-18 NOTE — PROGRESS NOTES
Established Patient - Audio Only Telehealth Visit     The patient location is: [place]   The chief complaint leading to consultation is: follow-up  Visit type: Virtual visit with audio only (telephone)  Total time spent with patient: 25min      The reason for the audio only service rather than synchronous audio and video virtual visit was related to technical difficulties or patient preference/necessity.     Each patient to whom I provide medical services by telemedicine is:  (1) informed of the relationship between the physician and patient and the respective role of any other health care provider with respect to management of the patient; and (2) notified that they may decline to receive medical services by telemedicine and may withdraw from such care at any time. Patient verbally consented to receive this service via voice-only telephone call.       HPI:   Ms. Dobbins is a 41yo  with GERD associated w/ small hiatal hernia, HTN, morbid obesity, reported fibromyalgia, and reported MDD/anxiety/BP1. Seen on 07/02/20 to re-establish care. Presents for follow-up.     GERD   Prescribed Protonix at last visit  Still having some nausea  [facility] bariatrics evaluating with EGD ?gastroparesis    HTN:  Pt reports controlled on checks at home    Cervical Radiculopathy/  Back pain, unspecified back location, unspecified back pain laterality, unspecified chronicity/  Chronic pain of both knees:  Following w/ NSGY: MRI and EMG, f/u 09/08/20  Following w/ Orthopedics: tylenol, voltaren gel, wt loss, PT Pt deferred steroids, f/u 02/22/2020    Starting PT 8/19/20 per Pt  MRI being scheduled    Obesity:  Following w/ Bariatrics [facility]; f/u 09/15/20  Eval for bariatric procedure in the works    DEANGELO eval:  CPAP supplies ordered 08/06/2020  Will contact CM for appropriate facilities from which to order based on insurance     Reported Anxiety/BP 1/MDD:  No call back from psych eyt  Will consider CM referral   Pt open to starting  cymbalta    Reported fibromyalgia:  Rheum appt on 8/25/20    Pre-diabetes:  Defer metformin presently as pursing bariatric procedure    Plantar fascitis/Equinus deformity of both feet/Onychodystrophy   Exostosis of toe:   Following w/ podiatry    Physical Exam:  Pt does not sound like she is acute distress.  Pt speaks in full sentences w/o noted difficulty.   No cough, wheezing, SOB appreciated.        Assessment and plan:    Diagnoses and all orders for this visit:    Nausea  Prescribed Protonix at last visit  Still having some nausea  [facility] bariatrics evaluating with EGD ?gastroparesis  -     ondansetron (ZOFRAN) 4 MG tablet; Take 1 tablet (4 mg total) by mouth every 8 (eight) hours as needed for Nausea.    Anxiety  Pt reported previous diagnosis of Anxiety/BP 1/MDD/fibromyalgia:  No call back from psych yet  Will refer to CHW  Rheum appt upcoming  Also, start cymblata 30mg qd for 1 week; if well-tolerated increase to 60mg qd  -     DULoxetine (CYMBALTA) 30 MG capsule; Take 1 capsule (30 mg total) by mouth once daily.  -     Ambulatory referral/consult to Community Health Workers (CHW); Future    Back pain, unspecified back location, unspecified back pain laterality, unspecified chronicity  Chronic pain of both knees  Cervical radiculopathy  F/u w/ NSGY and orthopedics; MRI and EMG pending  Start cymbalta  -     DULoxetine (CYMBALTA) 30 MG capsule; Take 1 capsule (30 mg total) by mouth once daily.      Will message CM regarding CPAP supply status.   F/u pending bariatric eval in 2-3 months.    Case d/w Dr. Villegas.     Elisa Ceballos MD  PGY3  Internal Medicine        This service was not originating from a related E/M service provided within the previous 7 days nor will  to an E/M service or procedure within the next 24 hours or my soonest available appointment.  Prevailing standard of care was able to be met in this audio-only visit.

## 2020-08-19 ENCOUNTER — PATIENT MESSAGE (OUTPATIENT)
Dept: INTERNAL MEDICINE | Facility: CLINIC | Age: 40
End: 2020-08-19

## 2020-08-19 DIAGNOSIS — M54.12 CERVICAL RADICULOPATHY: Primary | ICD-10-CM

## 2020-08-21 ENCOUNTER — TELEPHONE (OUTPATIENT)
Dept: INTERNAL MEDICINE | Facility: CLINIC | Age: 40
End: 2020-08-21

## 2020-08-21 NOTE — TELEPHONE ENCOUNTER
----- Message from Radha Ziegler sent at 8/21/2020 11:36 AM CDT -----  Contact: [phone] or [phone]  Pt is requsting a callback in regards to scheduling an appt with Dr. Ceballos between the dates of 10/12/2020-----10/16/2020 due to a Weight Loss follow-up.    Please call and advise

## 2020-08-24 ENCOUNTER — TELEPHONE (OUTPATIENT)
Dept: RHEUMATOLOGY | Facility: CLINIC | Age: 40
End: 2020-08-24

## 2020-08-24 NOTE — TELEPHONE ENCOUNTER
Left message to confirm apt for 8.25.20 at 4.15 pm with Dr. Mercer at the [facility] clinic.

## 2020-08-28 ENCOUNTER — TELEPHONE (OUTPATIENT)
Dept: RHEUMATOLOGY | Facility: CLINIC | Age: 40
End: 2020-08-28

## 2020-08-28 NOTE — TELEPHONE ENCOUNTER
Left message to confirm apt for 8.31.20 at 2.15 pm with Dr. Mercer at the [facility] clinic.

## 2020-09-01 ENCOUNTER — HOSPITAL ENCOUNTER (OUTPATIENT)
Dept: RADIOLOGY | Facility: HOSPITAL | Age: 40
Discharge: HOME | End: 2020-09-01
Attending: PHYSICIAN ASSISTANT
Payer: MEDICAID

## 2020-09-01 ENCOUNTER — TELEPHONE (OUTPATIENT)
Dept: BEHAVIORAL HEALTH | Facility: CLINIC | Age: 40
End: 2020-09-01

## 2020-09-01 DIAGNOSIS — M54.9 DORSALGIA, UNSPECIFIED: ICD-10-CM

## 2020-09-01 DIAGNOSIS — M54.12 RADICULOPATHY, CERVICAL REGION: ICD-10-CM

## 2020-09-01 PROCEDURE — 72148 MRI LUMBAR SPINE WITHOUT CONTRAST: ICD-10-PCS | Mod: 26,,, | Performed by: RADIOLOGY

## 2020-09-01 PROCEDURE — 72141 MRI NECK SPINE W/O DYE: CPT | Mod: 26,,, | Performed by: RADIOLOGY

## 2020-09-01 PROCEDURE — 72148 MRI LUMBAR SPINE W/O DYE: CPT | Mod: 26,,, | Performed by: RADIOLOGY

## 2020-09-01 PROCEDURE — 72148 MRI LUMBAR SPINE W/O DYE: CPT | Mod: TC,PO

## 2020-09-01 PROCEDURE — 72141 MRI CERVICAL SPINE WITHOUT CONTRAST: ICD-10-PCS | Mod: 26,,, | Performed by: RADIOLOGY

## 2020-09-01 PROCEDURE — 72141 MRI NECK SPINE W/O DYE: CPT | Mod: TC,PO

## 2020-09-11 ENCOUNTER — TELEPHONE (OUTPATIENT)
Dept: BEHAVIORAL HEALTH | Facility: CLINIC | Age: 40
End: 2020-09-11

## 2020-09-14 ENCOUNTER — OFFICE VISIT (OUTPATIENT)
Dept: INTERNAL MEDICINE | Facility: CLINIC | Age: 40
End: 2020-09-14
Payer: MEDICAID

## 2020-09-14 ENCOUNTER — PATIENT MESSAGE (OUTPATIENT)
Dept: INTERNAL MEDICINE | Facility: CLINIC | Age: 40
End: 2020-09-14

## 2020-09-14 DIAGNOSIS — I10 ESSENTIAL HYPERTENSION: ICD-10-CM

## 2020-09-14 DIAGNOSIS — G89.29 CHRONIC PAIN OF BOTH KNEES: ICD-10-CM

## 2020-09-14 DIAGNOSIS — M25.562 CHRONIC PAIN OF BOTH KNEES: ICD-10-CM

## 2020-09-14 DIAGNOSIS — K21.9 GASTROESOPHAGEAL REFLUX DISEASE, ESOPHAGITIS PRESENCE NOT SPECIFIED: Primary | ICD-10-CM

## 2020-09-14 DIAGNOSIS — F41.9 ANXIETY: ICD-10-CM

## 2020-09-14 DIAGNOSIS — K21.9 HIATAL HERNIA WITH GERD: ICD-10-CM

## 2020-09-14 DIAGNOSIS — M54.9 BACK PAIN, UNSPECIFIED BACK LOCATION, UNSPECIFIED BACK PAIN LATERALITY, UNSPECIFIED CHRONICITY: ICD-10-CM

## 2020-09-14 DIAGNOSIS — M54.12 CERVICAL RADICULOPATHY: ICD-10-CM

## 2020-09-14 DIAGNOSIS — K44.9 HIATAL HERNIA WITH GERD: ICD-10-CM

## 2020-09-14 DIAGNOSIS — M25.561 CHRONIC PAIN OF BOTH KNEES: ICD-10-CM

## 2020-09-14 PROCEDURE — 99214 PR OFFICE/OUTPT VISIT, EST, LEVL IV, 30-39 MIN: ICD-10-PCS | Mod: 95,,, | Performed by: STUDENT IN AN ORGANIZED HEALTH CARE EDUCATION/TRAINING PROGRAM

## 2020-09-14 PROCEDURE — 99214 OFFICE O/P EST MOD 30 MIN: CPT | Mod: 95,,, | Performed by: STUDENT IN AN ORGANIZED HEALTH CARE EDUCATION/TRAINING PROGRAM

## 2020-09-14 RX ORDER — ASPIRIN 325 MG
50000 TABLET, DELAYED RELEASE (ENTERIC COATED) ORAL
COMMUNITY
Start: 2020-08-06 | End: 2021-01-28 | Stop reason: SDUPTHER

## 2020-09-14 RX ORDER — PANTOPRAZOLE SODIUM 40 MG/1
40 TABLET, DELAYED RELEASE ORAL DAILY
Qty: 30 TABLET | Refills: 1 | Status: SHIPPED | OUTPATIENT
Start: 2020-09-14 | End: 2020-11-10 | Stop reason: SDUPTHER

## 2020-09-14 RX ORDER — DULOXETIN HYDROCHLORIDE 30 MG/1
60 CAPSULE, DELAYED RELEASE ORAL DAILY
Qty: 60 CAPSULE | Refills: 11
Start: 2020-09-14 | End: 2021-09-14

## 2020-09-14 RX ORDER — LISINOPRIL AND HYDROCHLOROTHIAZIDE 20; 25 MG/1; MG/1
1 TABLET ORAL DAILY
Qty: 90 TABLET | Refills: 3 | Status: SHIPPED | OUTPATIENT
Start: 2020-09-14 | End: 2021-02-26

## 2020-09-14 RX ORDER — ALBUTEROL SULFATE 90 UG/1
2 AEROSOL, METERED RESPIRATORY (INHALATION)
COMMUNITY
End: 2021-01-28 | Stop reason: SDUPTHER

## 2020-09-14 NOTE — Clinical Note
Hey all, thank you for trying to reach out to Ms. Dobbins. She says she has not received any calls. Just wanted to make sure we had the right contact info for her: [phone].    Thanks!  Dr. Ceballos

## 2020-09-14 NOTE — PROGRESS NOTES
Established Patient - Audio Only Telehealth Visit     The patient location is: [place]   The chief complaint leading to consultation is: follow-up  Visit type: Virtual visit with audio only (telephone)  Total time spent with patient: 25min       The reason for the audio only service rather than synchronous audio and video virtual visit was related to technical difficulties or patient preference/necessity.     Each patient to whom I provide medical services by telemedicine is:  (1) informed of the relationship between the physician and patient and the respective role of any other health care provider with respect to management of the patient; and (2) notified that they may decline to receive medical services by telemedicine and may withdraw from such care at any time. Patient verbally consented to receive this service via voice-only telephone call.       HPI:   Ms. Dobbins is a 39yo F with GERD associated w/ small hiatal hernia, HTN, morbid obesity, reported fibromyalgia, and reported MDD/anxiety/BP1. Seen on 07/02/20 to re-establish care. Presents for a follow-up today.     GERD:  Prescribed Protonix previously; has been taking 20mg in the AM with food  Still having significant nausea not responsive to zofran which has led to decreased appetite  [facility] bariatrics evaluating with EGD ?gastroparesis on 12/04/2020     HTN:  Pt reports checks at home: numbers in the 160s/90s  On lisinopril-HCTZ 20/12.5mg     Cervical Radiculopathy/  Back pain, unspecified back location, unspecified back pain laterality, unspecified chronicity/  Chronic pain of both knees:  Following w/ NSGY: MRI and EMG, f/u 09/17/20  Following w/ Orthopedics: tylenol, voltaren gel, wt loss, PT; Pt deferred steroids, f/u 09/17/2020     Obesity:  Following w/ Bariatrics [facility]; f/u 09/15/20  Eval for bariatric procedure in the works     DEANGELO eval:  CPAP supplies ordered 08/06/2020  Contacted CM for appropriate facilities from which to order based on insurance    Per CM, Pt will need to talk to insurance to find a supply company     Reported Anxiety/BP 1/MDD:  CM has reached out twice to Pt  Pt has been taking cymbalta 60mg; uncertain if improvement in msk pain or mood     Reported fibromyalgia:  Rheum appt on 10/29/20     Pre-diabetes:  Defer metformin presently as pursing bariatric procedure     Plantar fascitis/Equinus deformity of both feet/Onychodystrophy   Exostosis of toe:   Following w/ podiatry     Physical Exam:  Pt does not sound like she is acute distress.  Pt speaks in full sentences w/o noted difficulty.   No cough, wheezing, SOB appreciated.      Assessment and plan:      Diagnoses and all orders for this visit:    Gastroesophageal reflux disease, esophagitis presence not specified  Hiatal hernia with GERD  Pt instructed on correct administration. EGD in Dec 2020.  -     pantoprazole (PROTONIX) 40 MG tablet; Take 1 tablet (40 mg total) by mouth once daily.    Essential hypertension  Uncontrolled at home. Increase antihypertensive today. Pt to log and [facility] to [facility]. Reminded regarding low Na diet and need to work with insurance to obrtain CPAP supplies. Continue to assess/emphasize Pt adherence.   -     lisinopriL-hydrochlorothiazide (PRINZIDE,ZESTORETIC) 20-25 mg Tab; Take 1 tablet by mouth once daily.    Anxiety  Pt taking 60mg daily. Script updated. Will verify number with [facility] CM.   -     DULoxetine (CYMBALTA) 30 MG capsule; Take 2 capsules (60 mg total) by mouth once daily.    Back pain, unspecified back location, unspecified back pain laterality, unspecified chronicity  Chronic pain of both knees  Cervical radiculopathy  NSGY: MRI and EMG, f/u 09/17/20  Orthopedics: f/u 09/17/2020  -     DULoxetine (CYMBALTA) 30 MG capsule; Take 2 capsules (60 mg total) by mouth once daily.      Pt has follow-up with IM 10/12/2020. Pt encouraged to attend in-person appointment if able. Pt cites difficulty with ride assistance maximums. Prioritizing  bariatrics and other specialities at this time. Will require follow-up for HTN, reflux, HCM including flu.      Case d/w Dr. Galloway.      Elisa Ceballos MD  PGY3  Internal Medicine            This service was not originating from a related E/M service provided within the previous 7 days nor will  to an E/M service or procedure within the next 24 hours or my soonest available appointment.  Prevailing standard of care was able to be met in this audio-only visit.

## 2020-09-15 ENCOUNTER — TELEPHONE (OUTPATIENT)
Dept: BEHAVIORAL HEALTH | Facility: CLINIC | Age: 40
End: 2020-09-15

## 2020-09-15 NOTE — PROGRESS NOTES
Behavioral Health Community Health Worker  Initial Assessment  Completed by:  Asaf French    Date:  9/15/2020    Patient Enrollment in Behavioral Health Program:  · Patient verbalized understanding of Behavioral Health Integration services to include:  · Patient understands that CHW, LCSW, PharmD and consulting Psychiatrist are members of the care team working collaboratively with his/her primary care provider: Yes  · Patient understands that activation of their [facility] patient portal account is required for accessing the full scope of team services: Yes  · Patient understands that some counseling sessions may occur via video: Yes  · Clinic visits with the psychiatrist may be subject to a co-pay based on your insurance: Yes  · Patient consents to enroll in BHI program: Yes    Assessments     Single Item Health Literacy Scale:  ·      Promis 10:  · Promis 10 Responses  · In general, would you say your health is: Poor  · In general, would you say your quality of life is: Fair  · In general, how would you rate your physical health?: Fair  · In general, how would you rate your mental health, including your mood and your ability to think?: Fair  · In general, how would you rate your satisfaction with your social activities and relationships?: Poor  · In general, please rate how well you carry out your usual social activities and roles. (This includes activities at home, at work and in your community, and responsibilities as a parent, child, spouse, employee, friend, etc.): Fair  · To what extent are you able to carry out your everyday physical activities such as walking, climbing stairs, carrying groceries, or moving a chair? : Mostly  · In the past 7 days, how often have you been bothered by emotional problems such as feeling anxious, depressed or irritable?: Often  · In the past 7 days, how would you rate your fatigue on average?: Moderate  · In the past 7 days, on a scale of 0 to 10 (where 0 is no pain and 10  is the worst pain imaginable) how would you rate your pain on average?: 3  · Global Physical Health: 12  · Global Mental health Score: 9    Depression PHQ:  PHQ9 9/15/2020   Total Score 14         Generalized Anxiety Disorder 7-Item Scale:  GAD7 9/15/2020   1. Feeling nervous, anxious, or on edge? 3   2. Not being able to stop or control worrying? 3   3. Worrying too much about different things? 3   4. Trouble relaxing? 3   5. Being so restless that it is hard to sit still? 1   6. Becoming easily annoyed or irritable? 3   7. Feeling afraid as if something awful might happen? 3   8. If you checked off any problems, how difficult have these problems made it for you to do your work, take care of things at home, or get along with other people? 2   BELÉN-7 Score 19       History     Social History     Socioeconomic History    Marital status: Single     Spouse name: Not on file    Number of children: Not on file    Years of education: Not on file    Highest education level: Not on file   Occupational History    Not on file   Social Needs    Financial resource strain: Not very hard    Food insecurity     Worry: Sometimes true     Inability: Sometimes true    Transportation needs     Medical: Yes     Non-medical: Yes   Tobacco Use    Smoking status: Passive Smoke Exposure - Never Smoker    Smokeless tobacco: Never Used    Tobacco comment: in high school   Substance and Sexual Activity    Alcohol use: Yes     Frequency: Monthly or less     Drinks per session: 1 or 2     Binge frequency: Never     Comment: red wine 2x week    Drug use: No    Sexual activity: Never   Lifestyle    Physical activity     Days per week: 0 days     Minutes per session: 20 min    Stress: Very much   Relationships    Social connections     Talks on phone: More than three times a week     Gets together: Once a week     Attends [employer] service: Not on file     Active member of club or organization: Yes     Attends meetings of clubs or  "organizations: More than 4 times per year     Relationship status: Never    Other Topics Concern    Not on file   Social History Narrative    Live w/ children        Call Summary     Patient was referred to the BHI (Non-opioid) program by Primary Care Provider,  CHW contacted Yane Dobbins who reports depression and anxiety  that limits [her] activities of daily living (ADLs).   Patient scored "14" on the PHQ9 and "19" on the BELÉN 7. Based on these scores patient is eligible for the Behavioral health Integration (Non-opioid) Program. JOSEPHINE completed the intake and scheduled an appointment for patient with Orville Abdul LCSW, on Wednesday October 7,2020 at 10am.         "

## 2020-09-30 ENCOUNTER — TELEPHONE (OUTPATIENT)
Dept: OBSTETRICS AND GYNECOLOGY | Facility: CLINIC | Age: 40
End: 2020-09-30

## 2020-10-07 ENCOUNTER — TELEPHONE (OUTPATIENT)
Dept: INTERNAL MEDICINE | Facility: CLINIC | Age: 40
End: 2020-10-07

## 2020-10-07 ENCOUNTER — PATIENT MESSAGE (OUTPATIENT)
Dept: INTERNAL MEDICINE | Facility: CLINIC | Age: 40
End: 2020-10-07

## 2020-10-07 ENCOUNTER — OFFICE VISIT (OUTPATIENT)
Dept: BEHAVIORAL HEALTH | Facility: CLINIC | Age: 40
End: 2020-10-07
Payer: MEDICAID

## 2020-10-07 DIAGNOSIS — F41.9 ANXIETY: ICD-10-CM

## 2020-10-07 DIAGNOSIS — F39 MOOD DISORDER: ICD-10-CM

## 2020-10-07 PROCEDURE — 90791 PR PSYCHIATRIC DIAGNOSTIC EVALUATION: ICD-10-PCS | Mod: 95,AJ,HB, | Performed by: SOCIAL WORKER

## 2020-10-07 PROCEDURE — 90791 PSYCH DIAGNOSTIC EVALUATION: CPT | Mod: 95,AJ,HB, | Performed by: SOCIAL WORKER

## 2020-10-07 NOTE — LETTER
October 8, 2020      Elisa Ceballos MD  [address]  [facility] [address]           [facility]  [address]  [facility] [address]  Phone: [phone]  Fax: [phone]          Patient: Yane Dobbins   MR Number: [id number]   YOB: 1980   Date of Visit: 10/7/2020       Dear Dr. Elisa Ceballos:    Thank you for referring Yane Dobbisn to me for evaluation. Attached you will find relevant portions of my assessment and plan of care.    If you have questions, please do not hesitate to call me. I look forward to following Yane Dobbins along with you.    Sincerely,    Orville Abdul, [facility]    Enclosure  CC:  No Recipients    If you would like to receive this communication electronically, please contact [email or web address] or [phone] to request more information on [employer] access.    For providers and/or their staff who would like to refer a patient to [facility], please contact us through our one-stop-shop provider referral line, [facility] , at [phone].    If you feel you have received this communication in error or would no longer like to receive these types of communications, please e-mail [email or web address]
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
## 2020-10-08 ENCOUNTER — PATIENT MESSAGE (OUTPATIENT)
Dept: INTERNAL MEDICINE | Facility: CLINIC | Age: 40
End: 2020-10-08

## 2020-10-08 PROBLEM — F39 MOOD DISORDER: Status: ACTIVE | Noted: 2020-10-08

## 2020-10-09 NOTE — PROGRESS NOTES
I, Ashley Hill MD, have reviewed the LCSW's history and exam, and I agree with the assessment and plan.  Patient with bipolar disorder so will be referred to Psychiatry for medication management and long term therapy for history of trauma.  [facility] LCSW will coordinate with Psychiatry Department.    Time: 15 minutes

## 2020-10-11 ENCOUNTER — PATIENT MESSAGE (OUTPATIENT)
Dept: INTERNAL MEDICINE | Facility: CLINIC | Age: 40
End: 2020-10-11

## 2020-10-14 ENCOUNTER — PATIENT MESSAGE (OUTPATIENT)
Dept: INTERNAL MEDICINE | Facility: CLINIC | Age: 40
End: 2020-10-14

## 2020-10-20 ENCOUNTER — PATIENT MESSAGE (OUTPATIENT)
Dept: INTERNAL MEDICINE | Facility: CLINIC | Age: 40
End: 2020-10-20

## 2020-11-06 ENCOUNTER — PATIENT MESSAGE (OUTPATIENT)
Dept: INTERNAL MEDICINE | Facility: CLINIC | Age: 40
End: 2020-11-06

## 2020-11-06 DIAGNOSIS — K21.9 HIATAL HERNIA WITH GERD: Primary | ICD-10-CM

## 2020-11-06 DIAGNOSIS — K44.9 HIATAL HERNIA WITH GERD: Primary | ICD-10-CM

## 2020-11-09 RX ORDER — METOCLOPRAMIDE 5 MG/1
5 TABLET ORAL 4 TIMES DAILY PRN
Qty: 30 TABLET | Refills: 0 | Status: SHIPPED | OUTPATIENT
Start: 2020-11-09 | End: 2020-11-10 | Stop reason: SDUPTHER

## 2020-11-10 ENCOUNTER — PATIENT MESSAGE (OUTPATIENT)
Dept: INTERNAL MEDICINE | Facility: CLINIC | Age: 40
End: 2020-11-10

## 2020-11-10 ENCOUNTER — OFFICE VISIT (OUTPATIENT)
Dept: INTERNAL MEDICINE | Facility: CLINIC | Age: 40
End: 2020-11-10
Payer: MEDICAID

## 2020-11-10 DIAGNOSIS — K44.9 HIATAL HERNIA WITH GERD: Primary | ICD-10-CM

## 2020-11-10 DIAGNOSIS — K21.9 GASTROESOPHAGEAL REFLUX DISEASE, UNSPECIFIED WHETHER ESOPHAGITIS PRESENT: ICD-10-CM

## 2020-11-10 DIAGNOSIS — K21.9 HIATAL HERNIA WITH GERD: Primary | ICD-10-CM

## 2020-11-10 PROCEDURE — 99213 OFFICE O/P EST LOW 20 MIN: CPT | Mod: 95,,, | Performed by: STUDENT IN AN ORGANIZED HEALTH CARE EDUCATION/TRAINING PROGRAM

## 2020-11-10 PROCEDURE — 99213 PR OFFICE/OUTPT VISIT, EST, LEVL III, 20-29 MIN: ICD-10-PCS | Mod: 95,,, | Performed by: STUDENT IN AN ORGANIZED HEALTH CARE EDUCATION/TRAINING PROGRAM

## 2020-11-10 RX ORDER — PANTOPRAZOLE SODIUM 40 MG/1
80 TABLET, DELAYED RELEASE ORAL 2 TIMES DAILY
Qty: 120 TABLET | Refills: 3 | Status: SHIPPED | OUTPATIENT
Start: 2020-11-10 | End: 2021-01-28

## 2020-11-10 RX ORDER — METOCLOPRAMIDE 5 MG/1
5 TABLET ORAL 4 TIMES DAILY PRN
Qty: 60 TABLET | Refills: 3 | Status: SHIPPED | OUTPATIENT
Start: 2020-11-10 | End: 2021-01-28

## 2020-11-10 RX ORDER — SUCRALFATE 1 G/1
1 TABLET ORAL 4 TIMES DAILY
Qty: 60 TABLET | Refills: 3 | Status: SHIPPED | OUTPATIENT
Start: 2020-11-10 | End: 2021-01-28

## 2020-11-10 NOTE — PROGRESS NOTES
The patient location is: [place]   The chief complaint leading to consultation is: N/V    Visit type: audiovisual    Face to Face time with patient: 25 minutes of total time spent on the encounter, which includes face to face time and non-face to face time preparing to see the patient (eg, review of tests), Obtaining and/or reviewing separately obtained history, Documenting clinical information in the electronic or other health record, Independently interpreting results (not separately reported) and communicating results to the patient/family/caregiver, or Care coordination (not separately reported).     Each patient to whom he or she provides medical services by telemedicine is:  (1) informed of the relationship between the physician and patient and the respective role of any other health care provider with respect to management of the patient; and (2) notified that he or she may decline to receive medical services by telemedicine and may withdraw from such care at any time.    Notes:     HPI:   Ms. Dobbins is a 39yo F with GERD associated w/ hiatal hernia, HTN, morbid obesity, reported fibromyalgia, and reported MDD/anxiety/BP1. Presents today due to N/V.     GERD:  Known hiatal hernia  Prescribed Protonix previously; have titrated up to 40mg BID.  Still having significant nausea, though, in addition to several episodes of non-bilious, non-bloody vomiting.   Has a constant bad taste in the back of her throat.   Feels like every time she eats, food is going to come back up.   She also has occasional mild, epigastric pain.   She has not tried anything other than the protonix.   She is taking the protonix at least 30min before meals.   She has also been changing her diet per her bariatric nutritionist and implementing lifestyle changes to no avail.   She denies any dark or tarry appearing stool. BMs are regular.     Physical Exam:  Pt does not sound like she is acute distress.  Pt speaks in full sentences w/o noted  difficulty.   No cough, wheezing, SOB appreciated.     Assessment and plan:      Diagnoses and all orders for this visit:    Hiatal hernia with GERD  Gastroesophageal reflux disease, unspecified whether esophagitis present  Remains uncontrolled. Will refer to [facility] GI for further evaluation of hiatal hernia. May require surgical intervention for definitive results. In interim, will increase PPI to 80mg BID and prescribe Reglan and sucralfate to try and mitigate symptoms.   -     pantoprazole (PROTONIX) 40 MG tablet; Take 2 tablets (80 mg total) by mouth 2 (two) times daily.  -     Ambulatory referral/consult to Gastroenterology; Future  -     sucralfate (CARAFATE) 1 gram tablet; Take 1 tablet (1 g total) by mouth 4 (four) times daily.  -     metoclopramide HCl (REGLAN) 5 MG tablet; Take 1 tablet (5 mg total) by mouth 4 (four) times daily as needed (nausea).    Case d/w Dr. Villegas.    Elisa Ceballos MD  PGY3  Internal Medicine

## 2020-11-23 NOTE — PROGRESS NOTES
I have reviewed the notes, assessments, and/or procedures performed this visit, and I concur with the assessment and plan.  Will refer patient to GI for further evaluation if not responsive to current regimen.

## 2020-11-25 ENCOUNTER — PATIENT MESSAGE (OUTPATIENT)
Dept: INTERNAL MEDICINE | Facility: CLINIC | Age: 40
End: 2020-11-25

## 2020-12-11 ENCOUNTER — TELEPHONE (OUTPATIENT)
Dept: INTERNAL MEDICINE | Facility: CLINIC | Age: 40
End: 2020-12-11

## 2020-12-11 NOTE — TELEPHONE ENCOUNTER
Received fax from [facility]  Requesting dose/direction verification for pantoprazole 40mg - 2 BID    Please advise

## 2020-12-15 ENCOUNTER — PATIENT MESSAGE (OUTPATIENT)
Dept: BEHAVIORAL HEALTH | Facility: CLINIC | Age: 40
End: 2020-12-15

## 2021-01-04 ENCOUNTER — PATIENT MESSAGE (OUTPATIENT)
Dept: INTERNAL MEDICINE | Facility: CLINIC | Age: 41
End: 2021-01-04

## 2021-01-08 ENCOUNTER — OFFICE VISIT (OUTPATIENT)
Dept: OBSTETRICS AND GYNECOLOGY | Facility: CLINIC | Age: 41
End: 2021-01-08
Payer: MEDICAID

## 2021-01-08 VITALS
BODY MASS INDEX: 51.91 KG/M2 | HEIGHT: 63 IN | SYSTOLIC BLOOD PRESSURE: 158 MMHG | WEIGHT: 293 LBS | DIASTOLIC BLOOD PRESSURE: 94 MMHG

## 2021-01-08 DIAGNOSIS — N39.3 STRESS INCONTINENCE: ICD-10-CM

## 2021-01-08 DIAGNOSIS — I10 ESSENTIAL HYPERTENSION: ICD-10-CM

## 2021-01-08 DIAGNOSIS — N92.0 MENORRHAGIA WITH REGULAR CYCLE: Primary | ICD-10-CM

## 2021-01-08 PROCEDURE — 99202 PR OFFICE/OUTPT VISIT, NEW, LEVL II, 15-29 MIN: ICD-10-PCS | Mod: S$PBB,,, | Performed by: NURSE PRACTITIONER

## 2021-01-08 PROCEDURE — 99999 PR PBB SHADOW E&M-EST. PATIENT-LVL III: ICD-10-PCS | Mod: PBBFAC,,, | Performed by: NURSE PRACTITIONER

## 2021-01-08 PROCEDURE — 99999 PR PBB SHADOW E&M-EST. PATIENT-LVL III: CPT | Mod: PBBFAC,,, | Performed by: NURSE PRACTITIONER

## 2021-01-08 PROCEDURE — 99202 OFFICE O/P NEW SF 15 MIN: CPT | Mod: S$PBB,,, | Performed by: NURSE PRACTITIONER

## 2021-01-08 PROCEDURE — 99213 OFFICE O/P EST LOW 20 MIN: CPT | Mod: PBBFAC | Performed by: NURSE PRACTITIONER

## 2021-01-10 ENCOUNTER — TELEPHONE (OUTPATIENT)
Dept: OBSTETRICS AND GYNECOLOGY | Facility: CLINIC | Age: 41
End: 2021-01-10

## 2021-01-14 ENCOUNTER — PATIENT MESSAGE (OUTPATIENT)
Dept: INTERNAL MEDICINE | Facility: CLINIC | Age: 41
End: 2021-01-14

## 2021-01-14 ENCOUNTER — PATIENT MESSAGE (OUTPATIENT)
Dept: OBSTETRICS AND GYNECOLOGY | Facility: CLINIC | Age: 41
End: 2021-01-14

## 2021-01-15 ENCOUNTER — TELEPHONE (OUTPATIENT)
Dept: RADIOLOGY | Facility: HOSPITAL | Age: 41
End: 2021-01-15

## 2021-01-19 ENCOUNTER — HOSPITAL ENCOUNTER (OUTPATIENT)
Dept: RADIOLOGY | Facility: HOSPITAL | Age: 41
Discharge: HOME | End: 2021-01-19
Attending: NURSE PRACTITIONER
Payer: MEDICAID

## 2021-01-19 ENCOUNTER — PATIENT MESSAGE (OUTPATIENT)
Dept: INTERNAL MEDICINE | Facility: CLINIC | Age: 41
End: 2021-01-19

## 2021-01-19 ENCOUNTER — OFFICE VISIT (OUTPATIENT)
Dept: OBSTETRICS AND GYNECOLOGY | Facility: CLINIC | Age: 41
End: 2021-01-19
Payer: MEDICAID

## 2021-01-19 VITALS — BODY MASS INDEX: 51.91 KG/M2 | WEIGHT: 293 LBS | HEIGHT: 63 IN

## 2021-01-19 VITALS — DIASTOLIC BLOOD PRESSURE: 98 MMHG | WEIGHT: 293 LBS | SYSTOLIC BLOOD PRESSURE: 158 MMHG | BODY MASS INDEX: 62.72 KG/M2

## 2021-01-19 DIAGNOSIS — Z12.39 ENCOUNTER FOR OTHER SCREENING FOR MALIGNANT NEOPLASM OF BREAST: ICD-10-CM

## 2021-01-19 DIAGNOSIS — Z01.419 ROUTINE GYNECOLOGICAL EXAMINATION: Primary | ICD-10-CM

## 2021-01-19 DIAGNOSIS — Z12.4 PAPANICOLAOU SMEAR FOR CERVICAL CANCER SCREENING: ICD-10-CM

## 2021-01-19 DIAGNOSIS — N39.3 STRESS INCONTINENCE: ICD-10-CM

## 2021-01-19 DIAGNOSIS — G47.33 MODERATE OBSTRUCTIVE SLEEP APNEA: Primary | ICD-10-CM

## 2021-01-19 DIAGNOSIS — N92.0 MENORRHAGIA WITH REGULAR CYCLE: ICD-10-CM

## 2021-01-19 PROCEDURE — 77067 SCR MAMMO BI INCL CAD: CPT | Mod: TC

## 2021-01-19 PROCEDURE — 76856 US EXAM PELVIC COMPLETE: CPT | Mod: TC

## 2021-01-19 PROCEDURE — 99213 OFFICE O/P EST LOW 20 MIN: CPT | Mod: PBBFAC,25 | Performed by: NURSE PRACTITIONER

## 2021-01-19 PROCEDURE — 76830 US PELVIS COMP WITH TRANSVAG NON-OB (XPD): ICD-10-PCS | Mod: 26,,, | Performed by: RADIOLOGY

## 2021-01-19 PROCEDURE — 99999 PR PBB SHADOW E&M-EST. PATIENT-LVL III: ICD-10-PCS | Mod: PBBFAC,,, | Performed by: NURSE PRACTITIONER

## 2021-01-19 PROCEDURE — 99396 PR PREVENTIVE VISIT,EST,40-64: ICD-10-PCS | Mod: S$PBB,,, | Performed by: NURSE PRACTITIONER

## 2021-01-19 PROCEDURE — 77063 BREAST TOMOSYNTHESIS BI: CPT | Mod: 26,,, | Performed by: RADIOLOGY

## 2021-01-19 PROCEDURE — 87624 HPV HI-RISK TYP POOLED RSLT: CPT

## 2021-01-19 PROCEDURE — 88175 CYTOPATH C/V AUTO FLUID REDO: CPT

## 2021-01-19 PROCEDURE — 76856 US PELVIS COMP WITH TRANSVAG NON-OB (XPD): ICD-10-PCS | Mod: 26,,, | Performed by: RADIOLOGY

## 2021-01-19 PROCEDURE — 99999 PR PBB SHADOW E&M-EST. PATIENT-LVL III: CPT | Mod: PBBFAC,,, | Performed by: NURSE PRACTITIONER

## 2021-01-19 PROCEDURE — 87086 URINE CULTURE/COLONY COUNT: CPT

## 2021-01-19 PROCEDURE — 76856 US EXAM PELVIC COMPLETE: CPT | Mod: 26,,, | Performed by: RADIOLOGY

## 2021-01-19 PROCEDURE — 76830 TRANSVAGINAL US NON-OB: CPT | Mod: 26,,, | Performed by: RADIOLOGY

## 2021-01-19 PROCEDURE — 77067 MAMMO DIGITAL SCREENING BILAT WITH TOMO: ICD-10-PCS | Mod: 26,,, | Performed by: RADIOLOGY

## 2021-01-19 PROCEDURE — 77067 SCR MAMMO BI INCL CAD: CPT | Mod: 26,,, | Performed by: RADIOLOGY

## 2021-01-19 PROCEDURE — 77063 MAMMO DIGITAL SCREENING BILAT WITH TOMO: ICD-10-PCS | Mod: 26,,, | Performed by: RADIOLOGY

## 2021-01-19 PROCEDURE — 99396 PREV VISIT EST AGE 40-64: CPT | Mod: S$PBB,,, | Performed by: NURSE PRACTITIONER

## 2021-01-21 ENCOUNTER — TELEPHONE (OUTPATIENT)
Dept: OBSTETRICS AND GYNECOLOGY | Facility: CLINIC | Age: 41
End: 2021-01-21

## 2021-01-21 ENCOUNTER — PATIENT MESSAGE (OUTPATIENT)
Dept: OBSTETRICS AND GYNECOLOGY | Facility: CLINIC | Age: 41
End: 2021-01-21

## 2021-01-21 LAB — BACTERIA UR CULT: NO GROWTH

## 2021-01-22 ENCOUNTER — TELEPHONE (OUTPATIENT)
Dept: OBSTETRICS AND GYNECOLOGY | Facility: CLINIC | Age: 41
End: 2021-01-22

## 2021-01-22 DIAGNOSIS — N39.3 STRESS INCONTINENCE: Primary | ICD-10-CM

## 2021-01-22 RX ORDER — OXYBUTYNIN CHLORIDE 5 MG/1
5 TABLET ORAL 2 TIMES DAILY
Qty: 60 TABLET | Refills: 2 | Status: SHIPPED | OUTPATIENT
Start: 2021-01-22 | End: 2022-01-22

## 2021-01-25 LAB
HPV HR 12 DNA SPEC QL NAA+PROBE: NEGATIVE
HPV16 AG SPEC QL: NEGATIVE
HPV18 DNA SPEC QL NAA+PROBE: NEGATIVE

## 2021-01-28 ENCOUNTER — LAB VISIT (OUTPATIENT)
Dept: LAB | Facility: HOSPITAL | Age: 41
End: 2021-01-28
Payer: MEDICAID

## 2021-01-28 ENCOUNTER — OFFICE VISIT (OUTPATIENT)
Dept: INTERNAL MEDICINE | Facility: CLINIC | Age: 41
End: 2021-01-28
Payer: MEDICAID

## 2021-01-28 VITALS
BODY MASS INDEX: 51.91 KG/M2 | HEIGHT: 63 IN | DIASTOLIC BLOOD PRESSURE: 88 MMHG | WEIGHT: 293 LBS | SYSTOLIC BLOOD PRESSURE: 155 MMHG

## 2021-01-28 DIAGNOSIS — R73.03 PREDIABETES: ICD-10-CM

## 2021-01-28 DIAGNOSIS — Z00.00 HEALTHCARE MAINTENANCE: ICD-10-CM

## 2021-01-28 DIAGNOSIS — E55.9 VITAMIN D INSUFFICIENCY: ICD-10-CM

## 2021-01-28 DIAGNOSIS — K21.9 HIATAL HERNIA WITH GERD: ICD-10-CM

## 2021-01-28 DIAGNOSIS — K44.9 HIATAL HERNIA WITH GERD: ICD-10-CM

## 2021-01-28 DIAGNOSIS — R73.03 PREDIABETES: Primary | ICD-10-CM

## 2021-01-28 DIAGNOSIS — K21.9 GASTROESOPHAGEAL REFLUX DISEASE, UNSPECIFIED WHETHER ESOPHAGITIS PRESENT: ICD-10-CM

## 2021-01-28 DIAGNOSIS — E66.01 CLASS 3 SEVERE OBESITY DUE TO EXCESS CALORIES WITHOUT SERIOUS COMORBIDITY WITH BODY MASS INDEX (BMI) OF 60.0 TO 69.9 IN ADULT: ICD-10-CM

## 2021-01-28 DIAGNOSIS — R06.2 WHEEZE: ICD-10-CM

## 2021-01-28 LAB
BASOPHILS # BLD AUTO: 0.04 K/UL (ref 0–0.2)
BASOPHILS NFR BLD: 1 % (ref 0–1.9)
DIFFERENTIAL METHOD: ABNORMAL
EOSINOPHIL # BLD AUTO: 0.1 K/UL (ref 0–0.5)
EOSINOPHIL NFR BLD: 2.2 % (ref 0–8)
ERYTHROCYTE [DISTWIDTH] IN BLOOD BY AUTOMATED COUNT: 13.9 % (ref 11.5–14.5)
ESTIMATED AVG GLUCOSE: 114 MG/DL (ref 68–131)
HBA1C MFR BLD: 5.6 % (ref 4–5.6)
HCT VFR BLD AUTO: 42.1 % (ref 37–48.5)
HGB BLD-MCNC: 13.4 G/DL (ref 12–16)
IMM GRANULOCYTES # BLD AUTO: 0.01 K/UL (ref 0–0.04)
IMM GRANULOCYTES NFR BLD AUTO: 0.2 % (ref 0–0.5)
LYMPHOCYTES # BLD AUTO: 1.4 K/UL (ref 1–4.8)
LYMPHOCYTES NFR BLD: 34.6 % (ref 18–48)
MCH RBC QN AUTO: 29.8 PG (ref 27–31)
MCHC RBC AUTO-ENTMCNC: 31.8 G/DL (ref 32–36)
MCV RBC AUTO: 94 FL (ref 82–98)
MONOCYTES # BLD AUTO: 0.5 K/UL (ref 0.3–1)
MONOCYTES NFR BLD: 12.7 % (ref 4–15)
NEUTROPHILS # BLD AUTO: 2.1 K/UL (ref 1.8–7.7)
NEUTROPHILS NFR BLD: 49.3 % (ref 38–73)
NRBC BLD-RTO: 0 /100 WBC
PLATELET # BLD AUTO: 265 K/UL (ref 150–350)
PMV BLD AUTO: 11.1 FL (ref 9.2–12.9)
RBC # BLD AUTO: 4.5 M/UL (ref 4–5.4)
WBC # BLD AUTO: 4.16 K/UL (ref 3.9–12.7)

## 2021-01-28 PROCEDURE — 36415 COLL VENOUS BLD VENIPUNCTURE: CPT

## 2021-01-28 PROCEDURE — 82306 VITAMIN D 25 HYDROXY: CPT

## 2021-01-28 PROCEDURE — 99999 PR PBB SHADOW E&M-EST. PATIENT-LVL III: CPT | Mod: PBBFAC,,, | Performed by: STUDENT IN AN ORGANIZED HEALTH CARE EDUCATION/TRAINING PROGRAM

## 2021-01-28 PROCEDURE — 99214 PR OFFICE/OUTPT VISIT, EST, LEVL IV, 30-39 MIN: ICD-10-PCS | Mod: S$PBB,,, | Performed by: STUDENT IN AN ORGANIZED HEALTH CARE EDUCATION/TRAINING PROGRAM

## 2021-01-28 PROCEDURE — 99213 OFFICE O/P EST LOW 20 MIN: CPT | Mod: PBBFAC | Performed by: STUDENT IN AN ORGANIZED HEALTH CARE EDUCATION/TRAINING PROGRAM

## 2021-01-28 PROCEDURE — 80053 COMPREHEN METABOLIC PANEL: CPT

## 2021-01-28 PROCEDURE — 99999 PR PBB SHADOW E&M-EST. PATIENT-LVL III: ICD-10-PCS | Mod: PBBFAC,,, | Performed by: STUDENT IN AN ORGANIZED HEALTH CARE EDUCATION/TRAINING PROGRAM

## 2021-01-28 PROCEDURE — 86803 HEPATITIS C AB TEST: CPT

## 2021-01-28 PROCEDURE — 85025 COMPLETE CBC W/AUTO DIFF WBC: CPT

## 2021-01-28 PROCEDURE — 83036 HEMOGLOBIN GLYCOSYLATED A1C: CPT

## 2021-01-28 PROCEDURE — 99214 OFFICE O/P EST MOD 30 MIN: CPT | Mod: S$PBB,,, | Performed by: STUDENT IN AN ORGANIZED HEALTH CARE EDUCATION/TRAINING PROGRAM

## 2021-01-28 RX ORDER — ASPIRIN 325 MG
50000 TABLET, DELAYED RELEASE (ENTERIC COATED) ORAL
Qty: 8 CAPSULE | Refills: 0 | Status: SHIPPED | OUTPATIENT
Start: 2021-01-28 | End: 2023-08-11 | Stop reason: SDUPTHER

## 2021-01-28 RX ORDER — PANTOPRAZOLE SODIUM 40 MG/1
80 TABLET, DELAYED RELEASE ORAL 2 TIMES DAILY
Qty: 120 TABLET | Refills: 3 | Status: CANCELLED | OUTPATIENT
Start: 2021-01-28 | End: 2021-02-27

## 2021-01-28 RX ORDER — METFORMIN HYDROCHLORIDE 500 MG/1
500 TABLET ORAL
Qty: 90 TABLET | Refills: 3 | Status: SHIPPED | OUTPATIENT
Start: 2021-01-28 | End: 2021-02-26

## 2021-01-28 RX ORDER — ALBUTEROL SULFATE 90 UG/1
2 AEROSOL, METERED RESPIRATORY (INHALATION) EVERY 4 HOURS PRN
Qty: 18 G | Refills: 3 | Status: SHIPPED | OUTPATIENT
Start: 2021-01-28

## 2021-01-29 LAB
25(OH)D3+25(OH)D2 SERPL-MCNC: 13 NG/ML (ref 30–96)
ALBUMIN SERPL BCP-MCNC: 3.4 G/DL (ref 3.5–5.2)
ALP SERPL-CCNC: 67 U/L (ref 55–135)
ALT SERPL W/O P-5'-P-CCNC: 19 U/L (ref 10–44)
ANION GAP SERPL CALC-SCNC: 8 MMOL/L (ref 8–16)
AST SERPL-CCNC: 19 U/L (ref 10–40)
BILIRUB SERPL-MCNC: 0.3 MG/DL (ref 0.1–1)
BUN SERPL-MCNC: 17 MG/DL (ref 6–20)
CALCIUM SERPL-MCNC: 8.6 MG/DL (ref 8.7–10.5)
CHLORIDE SERPL-SCNC: 106 MMOL/L (ref 95–110)
CO2 SERPL-SCNC: 26 MMOL/L (ref 23–29)
CREAT SERPL-MCNC: 0.8 MG/DL (ref 0.5–1.4)
EST. GFR  (AFRICAN AMERICAN): >60 ML/MIN/1.73 M^2
EST. GFR  (NON AFRICAN AMERICAN): >60 ML/MIN/1.73 M^2
GLUCOSE SERPL-MCNC: 79 MG/DL (ref 70–110)
HCV AB SERPL QL IA: NEGATIVE
POTASSIUM SERPL-SCNC: 4.6 MMOL/L (ref 3.5–5.1)
PROT SERPL-MCNC: 7.1 G/DL (ref 6–8.4)
SODIUM SERPL-SCNC: 140 MMOL/L (ref 136–145)

## 2021-02-01 ENCOUNTER — TELEPHONE (OUTPATIENT)
Dept: INTERNAL MEDICINE | Facility: CLINIC | Age: 41
End: 2021-02-01

## 2021-02-02 ENCOUNTER — TELEPHONE (OUTPATIENT)
Dept: INTERNAL MEDICINE | Facility: CLINIC | Age: 41
End: 2021-02-02

## 2021-02-09 ENCOUNTER — PATIENT MESSAGE (OUTPATIENT)
Dept: INTERNAL MEDICINE | Facility: CLINIC | Age: 41
End: 2021-02-09

## 2021-02-10 ENCOUNTER — PATIENT MESSAGE (OUTPATIENT)
Dept: OBSTETRICS AND GYNECOLOGY | Facility: CLINIC | Age: 41
End: 2021-02-10

## 2021-02-11 LAB
FINAL PATHOLOGIC DIAGNOSIS: NORMAL
Lab: NORMAL

## 2021-02-15 ENCOUNTER — PATIENT MESSAGE (OUTPATIENT)
Dept: INTERNAL MEDICINE | Facility: CLINIC | Age: 41
End: 2021-02-15

## 2021-02-23 ENCOUNTER — PATIENT MESSAGE (OUTPATIENT)
Dept: RHEUMATOLOGY | Facility: CLINIC | Age: 41
End: 2021-02-23

## 2021-02-26 ENCOUNTER — OFFICE VISIT (OUTPATIENT)
Dept: INTERNAL MEDICINE | Facility: CLINIC | Age: 41
End: 2021-02-26
Payer: MEDICAID

## 2021-02-26 DIAGNOSIS — I10 ESSENTIAL HYPERTENSION: Primary | ICD-10-CM

## 2021-02-26 DIAGNOSIS — M54.32 LEFT SIDED SCIATICA: ICD-10-CM

## 2021-02-26 DIAGNOSIS — R73.03 PREDIABETES: ICD-10-CM

## 2021-02-26 PROBLEM — Z01.818 PREOP TESTING: Status: ACTIVE | Noted: 2020-08-06

## 2021-02-26 PROBLEM — M79.7 FIBROMYALGIA: Status: ACTIVE | Noted: 2020-08-06

## 2021-02-26 PROBLEM — E55.9 VITAMIN D DEFICIENCY: Status: ACTIVE | Noted: 2020-08-06

## 2021-02-26 PROCEDURE — 99214 PR OFFICE/OUTPT VISIT, EST, LEVL IV, 30-39 MIN: ICD-10-PCS | Mod: 95,,, | Performed by: INTERNAL MEDICINE

## 2021-02-26 PROCEDURE — 99214 OFFICE O/P EST MOD 30 MIN: CPT | Mod: 95,,, | Performed by: INTERNAL MEDICINE

## 2021-02-26 RX ORDER — DULOXETIN HYDROCHLORIDE 20 MG/1
20 CAPSULE, DELAYED RELEASE ORAL
COMMUNITY
End: 2021-02-26

## 2021-02-26 RX ORDER — LISINOPRIL 40 MG/1
40 TABLET ORAL DAILY
Qty: 4 TABLET | Refills: 0 | Status: SHIPPED | OUTPATIENT
Start: 2021-02-26 | End: 2023-03-15

## 2021-08-11 ENCOUNTER — PATIENT OUTREACH (OUTPATIENT)
Dept: ADMINISTRATIVE | Facility: OTHER | Age: 41
End: 2021-08-11

## 2021-10-14 NOTE — TELEPHONE ENCOUNTER
IV placement note:    22 gauge IV placed with 1 attempt. IV located in left forearm.  Blood return noted upon placement and IV catheter advanced without issue. No complaints of discomfort at IV site. No redness or increased temperature noted to IV site. Saline flushed in without infiltration. Curos cap applied. Primary RN notified of placement.     Informed patient that we must have a referral from a PCP prior to her being seen.  Patient verbalized understanding.

## 2022-02-05 NOTE — TELEPHONE ENCOUNTER
[address]   OPERATIVE REPORT    Name:  Isidoro Sales  MR#:  [id number]  :  1953  ACCOUNT #:  [de-identified]  DATE OF SERVICE:  2022      PREOPERATIVE DIAGNOSIS:  Gastric adenocarcinoma. POSTOPERATIVE DIAGNOSIS:  Gastric adenocarcinoma. PROCEDURES PERFORMED:  Robotic subtotal distal gastrectomy with D2 lymphadenectomy and Vania-en-Y gastrojejunostomy. SURGEON:  Yuliana Deng. Anthony Moore MD    ASSISTANT:  DESTINY Resendiz.  Lilia Escobar assistance was required secondary to the complex nature of this operation. She assisted throughout the entirety of the operation with exchange of instruments at the bedside, retraction, suction and closure. ANESTHESIA:  General.    COMPLICATIONS:  None. SPECIMENS REMOVED:  1. Omental nodule. 2.  Omental nodule #2. 3.  Subtotal distal gastrectomy with D2 lymphadenectomy. Disposition of all specimens to Pathology. IMPLANTS:  None. ESTIMATED BLOOD LOSS:  50 mL. DISPOSITION:  PACU. FINDINGS:  The patient had numerous small plaque-like lesions along the gastrocolic ligament that were sampled and negative on frozen section for metastatic disease. There were no other signs of metastatic disease noted. The margins of resection both proximally and distally were widely negative. The stomach was markedly distended with retained food suggestive of gastroparesis. INDICATIONS:  The patient is a 78-year-old female who was previously diagnosed with gastric adenocarcinoma due to anemia from an ulcerated lesion in the stomach. Staging evaluation at that time did not reveal any evidence of distant metastatic disease and she was treated with 4 cycles of chemotherapy. Followup imaging again revealed no evidence of metastatic disease and she was subsequently referred for surgical resection. A complete discussion of risks, benefits, and alternatives of surgery was had with the patient and she was in agreement to proceed.   We Spoke with patient,its hard for her to  the home sleep study device and bring it back the next morning here at [facility].  She asked if there is another place close to where she lives.  I only schedule for [facility].   discussed performing a distal subtotal gastrectomy with extended lymphadenectomy and Vania-en-Y reconstruction. We discussed the possibility of need to convert to open as well as possible feeding jejunostomy tube. After complete discussion of risks, benefits, and alternatives of surgery, she was in agreement to proceed and informed consent was obtained. DESCRIPTION OF THE OPERATION:  After informed consent was obtained, the patient was brought back to the operating room. She was placed under general endotracheal anesthesia in the supine position on the operating room table. A footboard was put in place. A Romero catheter was placed and her arms were extended. An orogastric tube was also placed to help decompress the stomach. Her abdomen was then prepped and draped in the usual sterile fashion and a proper time-out was performed. With this completed, we placed a Veress needle in the left upper quadrant of the abdomen. A standard water drop technique test was performed and the abdomen was then insufflated to 15 mmHg. We then tunneled into the abdomen using an 8-mm robotic trocar in the right mid abdomen. The abdomen was entered safely and thorough evaluation and inspection of the abdomen revealed no obvious findings of peritoneal metastatic disease at that time. We then placed additional trocars including a supraumbilical 8-mm robotic trocar, a left lateral abdominal 8-mm robotic trocar, and a right lateral 8-mm AirSeal trocar. We then placed a transverse incision between the midline and left lateral trocar sites for placement of mini GelPort with robotic stapling trocar within the port. Once these were placed, the patient was placed in reverse Trendelenburg position and the Needium robotic system was then docked. With the robot docked, the instruments were carefully inserted and advanced into the abdomen under direct visualization. I then went to the robotic console to control the instruments. Initial evaluation along the gastrocolic ligament showed several white plaque-like lesions that I was initially concerned were metastatic disease. I did biopsy 2 of these lesions and sent them for frozen section and both were confirmed to show no evidence of malignancy or previous metastatic disease. We made a decision at that point to proceed as no other metastatic disease was identified. There was an adhesion of the stomach up to the undersurface of the left lateral segment of the liver which was taken down with the vessel sealer device. We were then able to place a Loco retractor into the epigastric area to help retract the left lateral lobe of the liver. With this completed, we began by dividing the gastrocolic ligament near the transverse colon using the vessel sealer device and extending this up towards the left gastroepiploic pedicle near the initiation of the short gastric vessels. The patient's tumor appeared to be well distal to this and so I preserved the short gastric vessels with plans to preserve the upper portion of the stomach. We continued our dissection along the gastrocolic ligament over towards the hepatic flexure and this was all taken down with the vessel sealer device. This dropped the colon nicely out of the way and exposed the right gastroepiploic pedicle. The right gastroepiploic vein and artery were both taken at their base after being dissected free and controlled with Hem-o-ida clips and then divided. The posterior aspect of the stomach was completely dissected free and  from the pancreas. We then turned our attention to the lesser curvature side. The pars flaccida was opened and divided adjacent to the liver going up towards the gastroesophageal junction. This nicely exposed the common hepatic artery and celiac axis. There were numerous lymph nodes along this vascular supply.   Taking care to not injure these vessels, we began by dissecting the station 12 lymph nodes off the medial border of the yury hepatis and then continued this along the common hepatic artery chain of nodes headed back towards the celiac axis. These were all removed as a single packet extending along the splenic artery as well until this dove into the tail of the pancreas. Similarly, we dissected the nodes around the celiac axis and then divided the left gastric artery and vein at their base using Hem-o-ida and then scissors. This included all the lymph nodes along the left gastric vessels as well. We ensured that we included all of the station 1, 3, and 5 nodes along the lesser curvature of the stomach. Once this was completed, we prepared for division of the duodenum. We elevated the stomach anteriorly and using a blue Roxy-Strips reinforced robotic staple load, we divided the first portion of the duodenum distal to the pylorus. A second firing was needed to complete the transection of the duodenum. We then turned our attention towards transection of the proximal aspect of our dissection. This was done just distal to the GE junction, preserving the fundus of the stomach to preserve approximately one-quarter to one-third of the proximal stomach. The stomach was divided from the greater curvature side towards this GE junction using a 45-mm blue robotic staple load. Approximately 5 firings were required to complete this transection. At this point, the entirety of the specimen was free. This was then passed into a large Endo Catch bag and removed through the GelPort site. This was sent to Pathology and frozen section confirmed that our proximal and distal margins were widely clear. We inspected and had good hemostasis and as planned, extended lymphadenectomy had been performed as described above. No additional abnormal lymphatic tissue was noted. We then prepared for reconstruction.   We identified the ligament of Treitz and 50-55 cm distal to this, we divided the jejunum using a blue 45-mm robotic staple load. We split the mesentery using the vessel sealer device and encountered an additional 50-55 cm distal to this to create our jejunojejunostomy. This was done by aligning the bowel in an antiperistaltic fashion and a side-to-side functional end-to-end anastomosis was made using a 45-mm blue robotic staple load. The common enterotomy was closed using a 3-0 V-Loc suture in running fashion and then a second layer of running 3-0 V-Loc Lembert style suture. The mesenteric defect was also closed using a running 3-0 V-Loc suture. This anastomosis was under no tension and appeared very healthy. There was no evidence of leakage. We then brought our alimentary limb up to the stomach. This reached without difficulty. We then aligned this in a side-to-side antiperistaltic fashion as well. This was done using a 3-0 running V-Loc suture to fix the antimesenteric portion of the jejunum to the back wall of the stomach. This was aligned in an antecolic retrogastric fashion. Gastrotomy and enterotomy were made and then another 45-mm blue robotic staple load was used to create the anastomosis. The common enterotomy was then closed using 3-0 V-Loc suture in running fashion. Unfortunately, the patient's stomach was markedly distended and there was copious amounts of solid content within the stomach that appeared to have been there for a prolonged period of time. This was suggestive of severe gastroparesis. We suctioned this as clear as possible but there was some spillage and contamination from this. After this defect was closed, we closed it with a second layer including running 3-0 V-Loc suture in a Lembert style fashion for second layer closure of this. Sutures were placed both proximal and distal to the staple line to ensure no tension on the anastomosis. This also appeared healthy.   I did not feel that an endoscopy would be feasible to assess the anastomosis given the large amount of retained food within the stomach. Once this was completed, the abdomen had been cleaned and irrigated as much as possible. We placed a 19-Khmer round Davi drain out through the right lateral trocar site and placed this posterior to the gastrojejunostomy anastomosis. This also laid across the area of the duodenal stump and pancreas. This was secured at the skin using a 2-0 nylon suture. The abdomen was then allowed to desufflate and the remaining trocars were removed. The [employer] system had been undocked and all instruments removed. We then closed the extraction site defect using #1 PDS suture in full-thickness fashion from medial and lateral directions to reapproximate the fascia. Additional local anesthetic was injected around all incision sites. The extraction site incision was irrigated and then closed in additional layers including interrupted 3-0 Vicryl suture to reapproximate the Fransisco's fascial layer followed by 3-0 Vicryl suture at the deep dermis and subcutaneous tissue. A 4-0 Monocryl subcuticular stitch was then used to close the remaining incisions. Dermabond skin adhesive was then placed over all incision sites. The drain was connected to bulb suction. The patient was then awakened, extubated, and taken to the postanesthesia care unit in stable condition. All needle and instrument counts were correct at the completion of the case. I was present and scrubbed throughout the entirety of the case. There were no immediate complications.         MD PHI [id number]  D:  02/04/2022 18:01  T:  02/05/2022 4:01  JOB #:  [id number]

## 2022-04-07 ENCOUNTER — PATIENT MESSAGE (OUTPATIENT)
Dept: INTERNAL MEDICINE | Facility: CLINIC | Age: 42
End: 2022-04-07
Payer: MEDICAID

## 2022-04-07 NOTE — TELEPHONE ENCOUNTER
contacted   In [place] and thinks she has a blood clot in her leg   Her insurance is not covered in [facility]   She will go to emergency room   to be seen

## 2022-07-27 ENCOUNTER — TELEPHONE (OUTPATIENT)
Dept: PODIATRY | Facility: CLINIC | Age: 42
End: 2022-07-27
Payer: MEDICAID

## 2023-01-11 DIAGNOSIS — M21.619 BUNION: Primary | ICD-10-CM

## 2023-01-18 ENCOUNTER — HOSPITAL ENCOUNTER (OUTPATIENT)
Dept: RADIOLOGY | Facility: HOSPITAL | Age: 43
Discharge: HOME | End: 2023-01-18
Attending: PODIATRIST
Payer: MEDICAID

## 2023-01-18 DIAGNOSIS — M21.619 BUNION: ICD-10-CM

## 2023-01-18 PROCEDURE — 73630 X-RAY EXAM OF FOOT: CPT | Mod: 26,50,, | Performed by: RADIOLOGY

## 2023-01-18 PROCEDURE — 73630 XR FOOT COMPLETE 3 VIEW BILATERAL: ICD-10-PCS | Mod: 26,50,, | Performed by: RADIOLOGY

## 2023-01-18 PROCEDURE — 73630 X-RAY EXAM OF FOOT: CPT | Mod: TC,50,PO

## 2023-01-19 ENCOUNTER — OFFICE VISIT (OUTPATIENT)
Dept: PODIATRY | Facility: CLINIC | Age: 43
End: 2023-01-19
Payer: MEDICAID

## 2023-01-19 DIAGNOSIS — M25.376 INSTABILITY OF FOOT JOINT, UNSPECIFIED LATERALITY: ICD-10-CM

## 2023-01-19 DIAGNOSIS — Q66.212 ACQUIRED BILATERAL HALLUX VALGUS WITH METATARSUS PRIMUS: Primary | ICD-10-CM

## 2023-01-19 DIAGNOSIS — Q66.211 ACQUIRED BILATERAL HALLUX VALGUS WITH METATARSUS PRIMUS: Primary | ICD-10-CM

## 2023-01-19 DIAGNOSIS — M24.573 EQUINUS CONTRACTURE OF ANKLE: ICD-10-CM

## 2023-01-19 DIAGNOSIS — M79.7 FIBROMYALGIA: ICD-10-CM

## 2023-01-19 PROCEDURE — 1159F PR MEDICATION LIST DOCUMENTED IN MEDICAL RECORD: ICD-10-PCS | Mod: CPTII,,, | Performed by: PODIATRIST

## 2023-01-19 PROCEDURE — 1160F RVW MEDS BY RX/DR IN RCRD: CPT | Mod: CPTII,,, | Performed by: PODIATRIST

## 2023-01-19 PROCEDURE — 1159F MED LIST DOCD IN RCRD: CPT | Mod: CPTII,,, | Performed by: PODIATRIST

## 2023-01-19 PROCEDURE — 99999 PR PBB SHADOW E&M-EST. PATIENT-LVL I: CPT | Mod: PBBFAC,,, | Performed by: PODIATRIST

## 2023-01-19 PROCEDURE — 99999 PR PBB SHADOW E&M-EST. PATIENT-LVL I: ICD-10-PCS | Mod: PBBFAC,,, | Performed by: PODIATRIST

## 2023-01-19 PROCEDURE — 99214 PR OFFICE/OUTPT VISIT, EST, LEVL IV, 30-39 MIN: ICD-10-PCS | Mod: S$PBB,,, | Performed by: PODIATRIST

## 2023-01-19 PROCEDURE — 1160F PR REVIEW ALL MEDS BY PRESCRIBER/CLIN PHARMACIST DOCUMENTED: ICD-10-PCS | Mod: CPTII,,, | Performed by: PODIATRIST

## 2023-01-19 PROCEDURE — 99211 OFF/OP EST MAY X REQ PHY/QHP: CPT | Mod: PBBFAC | Performed by: PODIATRIST

## 2023-01-19 PROCEDURE — 99214 OFFICE O/P EST MOD 30 MIN: CPT | Mod: S$PBB,,, | Performed by: PODIATRIST

## 2023-01-19 NOTE — PROGRESS NOTES
Subjective:       Patient ID: Yane Dobbins is a 42 y.o. female.    Chief Complaint: Foot Problem (Bunions, history of 7 surgeries, thickness to nails causing pain to touch. Pain due to tailors bunion)      HPI: Yane Dobbins presents to the office today with complaints of moderate to severe pains to the right and left foot at the great toe due to bunion deformity.  She reports having 7 surgeries as a kid while living in [place].  She states that Dr. Tejeda performed a total of 7 surgeries on her feet.  Relates that the last surgery included placing a staple to the lateral physis of the proximal 1st metatarsal bone.  Does have increased pain associated with bunion deformity on the right foot and left foot.  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  Currently living in [place] but is moving back to [place].  Patient states pains are recalcitrant to NSAID therapy and wider width shoe gear. Patient has no had injection therapy to the 1st MTPJ of either limb. Patient has had discomfort to the great toe for the past several months. Patient is indeed interested in surgical intervention and/or cure for alleviation of symptoms. Patient's Primary Care Provider is Elisa Ceballos MD.     Review of patient's allergies indicates:   Allergen Reactions    Penicillins Diarrhea, Itching and Rash       Past Medical History:   Diagnosis Date    Anxiety     Depression     GERD (gastroesophageal reflux disease)     H/O fibromyalgia     Obesity     TIA (transient ischemic attack)     unclear if diagnosis established per chart review of associated admission    Toxemia in pregnancy        Family History   Problem Relation Age of Onset    Hypertension Mother     Diabetes Mother     Diabetes Father     Hypertension Father     Hypertension Brother     Breast cancer Maternal Grandmother     Stomach cancer Maternal Grandfather     Colon cancer Paternal Grandmother        Social History     Socioeconomic History    Marital status:  Single   Tobacco Use    Smoking status: Passive Smoke Exposure - Never Smoker    Smokeless tobacco: Never    Tobacco comments:     in high school   Substance and Sexual Activity    Alcohol use: Yes     Comment: red wine 2x week    Drug use: No    Sexual activity: Never   Social History Narrative    Live w/ children        Past Surgical History:   Procedure Laterality Date     SECTION      x2    CHOLECYSTECTOMY      FOOT SURGERY         Review of Systems      Objective:   There were no vitals taken for this visit.    X-Ray Foot Complete 3 view Bilateral  Narrative: EXAM: XR FOOT COMPLETE 3 VIEW BILATERAL    CLINICAL HISTORY: Bunion.    FINDINGS:  Comparison is made to bilateral foot radiographs 2020.    Stable is seen at the base of both first metatarsals.  Similar bilateral hallux valgus and metatarsus primus varus.  Lisfranc joints are maintained bilaterally.  No acute fracture involving either foot.  No dislocation.  Minimal scattered osteophytosis bilaterally.  Impression:  Bilateral bunion deformities.  Bilateral surgical changes to the first metatarsal bases.    Finalized on: 2023 9:41 AM By:  Papi Read MD  [facility]# [id number]      2023 09:[id number]    [facility]      Physical Exam   LOWER EXTREMITY PHYSICAL EXAMINATION    VASCULAR: On the right and left foot, the dorsalis pedis pulse is 2/4 and the posterior tibial pulse is 2/4. Capillary refill time is less than 3 seconds. Hair growth is present on the dorsum of the foot and at the digits. Proximal to distal temperature is warm to warm.    ORTHOPEDIC (right):  Moderate to severe bunion deformity is noted to the right foot. Upon ROM in the sagittal plan, there is moderate pain related at the end ROM, dorsally; no plantar pains at the 1st MTPJ are stated. No pains to palpation of the tibial or the fibular sesamoid. There is a large medial eminence appreciated. There is no dorsal spurring noted. Palpation of the dorsal-lateral aspect of the 1st  MTPJ is slightly painful. Hallux abductus is  noted.  Hallux interphalangeus is not noted. The slightly re is tracking. The hallux is not trackbound. There is instability noted at the 1st TMTJ.     ORTHOPEDIC (left):  Moderate bunion deformity is noted to the left foot. Upon ROM in the sagittal plan, there is mild pain related at the end ROM, dorsally; no plantar pains at the 1st MTPJ are stated. No pains to palpation of the tibial or the fibular sesamoid. There is a large medial eminence appreciated. There is no dorsal spurring noted. Palpation of the dorsal-lateral aspect of the 1st MTPJ is slightly painful. Hallux abductus is  noted.  Hallux interphalangeus is not noted. There is tracking. The hallux is not trackbound. There is instability noted at the 1st TMTJ.     DERMATOLOGY: Skin is supple, dry and intact. No ecchymosis is noted. No ulcerations are noted. Skin is supple and moist.     NEUROLOGY: Protective sensation is intact via 5.07 [place] monofilament. Proprioception is intact. Sensation to light touch is intact.     Assessment:     1. Acquired bilateral hallux valgus with metatarsus primus    2. Instability of foot joint, unspecified laterality    3. Equinus contracture of ankle    4. Fibromyalgia          Plan:     Acquired bilateral hallux valgus with metatarsus primus    Instability of foot joint, unspecified laterality    Equinus contracture of ankle    Fibromyalgia        Thorough discussion is had with the patient today, concerning the diagnosis, its etiology, and the treatment algorithm at present.    Discussed pathology etiology associated with hallux abductovalgus deformity to the right left foot.  X-rays were reviewed by myself patient room.  There is significant deformity present to the right left foot with a enlarged intermetatarsal angle.  Patient does have a slightly pes planus foot deformity but the lateral Meary's Angle is appropriate.  Equinus deformity is noted.    Discussed  surgical intervention in the future when patient does have time off as she is a teacher.  Discuss 1st metatarsal cuneiform joint arthrodesis with hardware removal to correct the deformity and provide a stable platform for ambulation.  Patient seems interested in this procedure.  Did discussed the procedure in detail in conjunction with the postoperative protocol.    Patient will consider surgical intervention the future and follow-up as needed        No future appointments.

## 2023-03-13 ENCOUNTER — TELEPHONE (OUTPATIENT)
Dept: PRIMARY CARE CLINIC | Facility: CLINIC | Age: 43
End: 2023-03-13
Payer: MEDICAID

## 2023-03-13 NOTE — TELEPHONE ENCOUNTER
----- Message from Brandie Joseph sent at 3/13/2023  2:41 PM CDT -----  Contact: [phone] Patient  Patient is returning a phone call.  Who left a message for the patient: [address]   Does patient know what this is regarding:  r/s 03/15 appt  Would you like a call back, or a response through your [facility] portal?:   call back  Comments:  Pt states she will be at the office in 15 minutes for an appt with her daughter

## 2023-03-15 ENCOUNTER — PATIENT MESSAGE (OUTPATIENT)
Dept: ADMINISTRATIVE | Facility: OTHER | Age: 43
End: 2023-03-15
Payer: MEDICAID

## 2023-03-15 ENCOUNTER — OFFICE VISIT (OUTPATIENT)
Dept: PRIMARY CARE CLINIC | Facility: CLINIC | Age: 43
End: 2023-03-15
Payer: MEDICAID

## 2023-03-15 ENCOUNTER — OFFICE VISIT (OUTPATIENT)
Dept: OBSTETRICS AND GYNECOLOGY | Facility: CLINIC | Age: 43
End: 2023-03-15
Payer: MEDICAID

## 2023-03-15 VITALS
HEIGHT: 63 IN | DIASTOLIC BLOOD PRESSURE: 90 MMHG | SYSTOLIC BLOOD PRESSURE: 128 MMHG | BODY MASS INDEX: 44.73 KG/M2 | WEIGHT: 252.44 LBS

## 2023-03-15 DIAGNOSIS — N62 MACROMASTIA: ICD-10-CM

## 2023-03-15 DIAGNOSIS — M25.512 CHRONIC PAIN OF BOTH SHOULDERS: ICD-10-CM

## 2023-03-15 DIAGNOSIS — G89.29 CHRONIC MIDLINE LOW BACK PAIN WITHOUT SCIATICA: ICD-10-CM

## 2023-03-15 DIAGNOSIS — Z12.31 ENCOUNTER FOR SCREENING MAMMOGRAM FOR MALIGNANT NEOPLASM OF BREAST: ICD-10-CM

## 2023-03-15 DIAGNOSIS — I10 ESSENTIAL HYPERTENSION: ICD-10-CM

## 2023-03-15 DIAGNOSIS — M46.1 SACROILIITIS: ICD-10-CM

## 2023-03-15 DIAGNOSIS — K29.50 OTHER CHRONIC GASTRITIS WITHOUT HEMORRHAGE: ICD-10-CM

## 2023-03-15 DIAGNOSIS — R11.2 INTRACTABLE NAUSEA AND VOMITING: ICD-10-CM

## 2023-03-15 DIAGNOSIS — Z01.419 ROUTINE GYNECOLOGICAL EXAMINATION: Primary | ICD-10-CM

## 2023-03-15 DIAGNOSIS — E66.01 CLASS 3 SEVERE OBESITY WITH SERIOUS COMORBIDITY AND BODY MASS INDEX (BMI) OF 40.0 TO 44.9 IN ADULT, UNSPECIFIED OBESITY TYPE: ICD-10-CM

## 2023-03-15 DIAGNOSIS — M54.50 CHRONIC MIDLINE LOW BACK PAIN WITHOUT SCIATICA: ICD-10-CM

## 2023-03-15 DIAGNOSIS — G89.29 CHRONIC PAIN OF BOTH SHOULDERS: ICD-10-CM

## 2023-03-15 DIAGNOSIS — G56.91 NEUROPATHY, ARM, RIGHT: Primary | ICD-10-CM

## 2023-03-15 DIAGNOSIS — G47.33 OSA (OBSTRUCTIVE SLEEP APNEA): ICD-10-CM

## 2023-03-15 DIAGNOSIS — M25.511 CHRONIC PAIN OF BOTH SHOULDERS: ICD-10-CM

## 2023-03-15 PROCEDURE — 4010F ACE/ARB THERAPY RXD/TAKEN: CPT | Mod: CPTII,95,, | Performed by: FAMILY MEDICINE

## 2023-03-15 PROCEDURE — 1160F PR REVIEW ALL MEDS BY PRESCRIBER/CLIN PHARMACIST DOCUMENTED: ICD-10-PCS | Mod: CPTII,,, | Performed by: NURSE PRACTITIONER

## 2023-03-15 PROCEDURE — 99999 PR PBB SHADOW E&M-EST. PATIENT-LVL III: CPT | Mod: PBBFAC,,, | Performed by: NURSE PRACTITIONER

## 2023-03-15 PROCEDURE — 99214 OFFICE O/P EST MOD 30 MIN: CPT | Mod: 95,,, | Performed by: FAMILY MEDICINE

## 2023-03-15 PROCEDURE — 3080F PR MOST RECENT DIASTOLIC BLOOD PRESSURE >= 90 MM HG: ICD-10-PCS | Mod: CPTII,,, | Performed by: NURSE PRACTITIONER

## 2023-03-15 PROCEDURE — 3008F PR BODY MASS INDEX (BMI) DOCUMENTED: ICD-10-PCS | Mod: CPTII,,, | Performed by: NURSE PRACTITIONER

## 2023-03-15 PROCEDURE — 99999 PR PBB SHADOW E&M-EST. PATIENT-LVL III: ICD-10-PCS | Mod: PBBFAC,,, | Performed by: NURSE PRACTITIONER

## 2023-03-15 PROCEDURE — 3074F PR MOST RECENT SYSTOLIC BLOOD PRESSURE < 130 MM HG: ICD-10-PCS | Mod: CPTII,,, | Performed by: NURSE PRACTITIONER

## 2023-03-15 PROCEDURE — 99213 OFFICE O/P EST LOW 20 MIN: CPT | Mod: PBBFAC | Performed by: NURSE PRACTITIONER

## 2023-03-15 PROCEDURE — 3080F DIAST BP >= 90 MM HG: CPT | Mod: CPTII,,, | Performed by: NURSE PRACTITIONER

## 2023-03-15 PROCEDURE — 3074F SYST BP LT 130 MM HG: CPT | Mod: CPTII,,, | Performed by: NURSE PRACTITIONER

## 2023-03-15 PROCEDURE — 1159F MED LIST DOCD IN RCRD: CPT | Mod: CPTII,,, | Performed by: NURSE PRACTITIONER

## 2023-03-15 PROCEDURE — 1160F RVW MEDS BY RX/DR IN RCRD: CPT | Mod: CPTII,,, | Performed by: NURSE PRACTITIONER

## 2023-03-15 PROCEDURE — 4010F PR ACE/ARB THEARPY RXD/TAKEN: ICD-10-PCS | Mod: CPTII,95,, | Performed by: FAMILY MEDICINE

## 2023-03-15 PROCEDURE — 99396 PR PREVENTIVE VISIT,EST,40-64: ICD-10-PCS | Mod: S$PBB,,, | Performed by: NURSE PRACTITIONER

## 2023-03-15 PROCEDURE — 1159F PR MEDICATION LIST DOCUMENTED IN MEDICAL RECORD: ICD-10-PCS | Mod: CPTII,,, | Performed by: NURSE PRACTITIONER

## 2023-03-15 PROCEDURE — 99214 PR OFFICE/OUTPT VISIT, EST, LEVL IV, 30-39 MIN: ICD-10-PCS | Mod: 95,,, | Performed by: FAMILY MEDICINE

## 2023-03-15 PROCEDURE — 4010F ACE/ARB THERAPY RXD/TAKEN: CPT | Mod: CPTII,,, | Performed by: NURSE PRACTITIONER

## 2023-03-15 PROCEDURE — 4010F PR ACE/ARB THEARPY RXD/TAKEN: ICD-10-PCS | Mod: CPTII,,, | Performed by: NURSE PRACTITIONER

## 2023-03-15 PROCEDURE — 99396 PREV VISIT EST AGE 40-64: CPT | Mod: S$PBB,,, | Performed by: NURSE PRACTITIONER

## 2023-03-15 PROCEDURE — 3008F BODY MASS INDEX DOCD: CPT | Mod: CPTII,,, | Performed by: NURSE PRACTITIONER

## 2023-03-15 RX ORDER — PANTOPRAZOLE SODIUM 40 MG/1
40 TABLET, DELAYED RELEASE ORAL
COMMUNITY
Start: 2022-07-28 | End: 2023-08-11 | Stop reason: SDUPTHER

## 2023-03-15 RX ORDER — ONDANSETRON 4 MG/1
8 TABLET, FILM COATED ORAL EVERY 8 HOURS PRN
Qty: 30 TABLET | Refills: 0 | Status: SHIPPED | OUTPATIENT
Start: 2023-03-15 | End: 2023-03-25

## 2023-03-15 RX ORDER — LISINOPRIL AND HYDROCHLOROTHIAZIDE 10; 12.5 MG/1; MG/1
1 TABLET ORAL DAILY
COMMUNITY

## 2023-03-15 NOTE — PROGRESS NOTES
"  Subjective:       Patient ID: Yane Dobbins is a 42 y.o. female.    Chief Complaint:  Consult      History of Present Illness  HPI  HPI  Presents today for WWE   Pap smear not indicated today   Presents today because she was suppose to come back for EMB due to heavy cycles however   Cycles are better since the gastric sleeve but she is still in a overnight pad but her cycles is not as long.  She will soon be getting a gastric bypass and  has decided to wait to see how her cycle will be after gastric bypass procedure before doing EMB for ablation.     Health Maintenance   Topic Date Due    TETANUS VACCINE  2008    Mammogram  2022    Hepatitis C Screening  Completed    Lipid Panel  Completed     GYN & OB History  Patient's last menstrual period was 03/10/2023.   Date of Last Pap: 2021    OB History    Para Term  AB Living   3 3 3         SAB IAB Ectopic Multiple Live Births                  # Outcome Date GA Lbr Jules/2nd Weight Sex Delivery Anes PTL Lv   3 Term            2 Term            1 Term                Review of Systems  Review of Systems        Objective:   BP (!) 128/90   Ht 5' 3" (1.6 m)   Wt 114.5 kg (252 lb 6.8 oz)   LMP 03/10/2023   BMI 44.72 kg/m²    Physical Exam   Declines exam   Assessment:        1. Routine gynecological examination    2. Encounter for screening mammogram for malignant neoplasm of breast               Plan:           Yane was seen today for consult.    Diagnoses and all orders for this visit:    Routine gynecological examination    Encounter for screening mammogram for malignant neoplasm of breast  -     Mammo Digital Screening Bilat; Future      "

## 2023-03-15 NOTE — PROGRESS NOTES
Subjective:       Patient ID: Yane Dobbins is a 42 y.o. female.    Chief Complaint:  Consult    Patient's last menstrual period was 03/10/2023.  History of Present Illness  {OBG HPI BLOCKS:82951}    OB History    Para Term  AB Living   3 3 3         SAB IAB Ectopic Multiple Live Births                  # Outcome Date GA Lbr Jules/2nd Weight Sex Delivery Anes PTL Lv   3 Term            2 Term            1 Term                Review of Systems  Review of Systems        Objective:    Physical Exam      Assessment:     1. Routine gynecological examination    2. Screening mammogram for breast cancer    3. Encounter for screening mammogram for malignant neoplasm of breast              Plan:   Yane was seen today for consult.    Diagnoses and all orders for this visit:    Routine gynecological examination    Screening mammogram for breast cancer    Encounter for screening mammogram for malignant neoplasm of breast  -     Mammo Digital Screening Bilat; Future      Subjective:       Patient ID: Yane Dobbins is a 42 y.o. female.    Chief Complaint:  Consult      History of Present Illness  HPI  Presents today for WWE   Pap smear not indicated today   Cycles are better since the the sleeve she is still in a overnight pad but it is not as long   Was intrested in ablation, however at this time she wantst towaing until gastic by pass to see hoe her bleeidng will be    Now at this point. Not sure of when she will be reciving gastic bypass   Health Maintenance   Topic Date Due    TETANUS VACCINE  2008    Mammogram  2022    Hepatitis C Screening  Completed    Lipid Panel  Completed     GYN & OB History  Patient's last menstrual period was 03/10/2023.   Date of Last Pap: 2021    OB History    Para Term  AB Living   3 3 3         SAB IAB Ectopic Multiple Live Births                  # Outcome Date GA Lbr Jules/2nd Weight Sex Delivery Anes PTL Lv   3 Term            2 Term            1  "Term                Review of Systems  Review of Systems        Objective:   BP (!) 128/90   Ht 5' 3" (1.6 m)   Wt 114.5 kg (252 lb 6.8 oz)   LMP 03/10/2023   BMI 44.72 kg/m²    Physical Exam     Assessment:        1. Routine gynecological examination    2. Screening mammogram for breast cancer    3. Encounter for screening mammogram for malignant neoplasm of breast       ***         Plan:      ***      Yane was seen today for consult.    Diagnoses and all orders for this visit:    Routine gynecological examination    Screening mammogram for breast cancer    Encounter for screening mammogram for malignant neoplasm of breast  -     Mammo Digital Screening Bilat; Future        "

## 2023-03-15 NOTE — PROGRESS NOTES
Subjective:    The patient location is: [place] at home  Visit type: Virtual visit with synchronous audio and video  Total time spent with patient:30 mins  This includes face to face time and non-face to face time preparing to see the patient (eg, review of tests), obtaining and/or reviewing separately obtained history, documenting clinical information in the electronic or other health record, independently interpreting results and communicating results to the patient/family/caregiver, or care coordinator.   Each patient to whom he or she provides medical services by telemedicine is:  (1) informed of the relationship between the physician and patient and the respective role of any other health care provider with respect to management of the patient; and (2) notified that he or she may decline to receive medical services by telemedicine and may withdraw from such care at any time.   Patient ID: Yane Dobbins is a 42 y.o. female.    Chief Complaint: Back Pain, Arm Pain, Establish Care, and Hypertension        History of Present Illness:   Yane Dobbins 42 y.o. 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 She has numbness and tingling and weak  to the right arm-asking for referral to PT.  Also with history of Gastric sleeve, now with revision being planned by Dr Le. Lost 106lbs. She has plateaued for the past 6 mons despite adhering to diet.  Persistent acid reflux despite PPI, with frequent vomiting in the past 6 mons. EGD yesterday-unable to view result.             Macromastia: decreased with weight loss but now hanging and dragging her shoulders down and causing constant pain and discomfort despite sports bra. Asking for referral to breast surgeon.   She has been diagnosed with DEANGELO and had cpap for 30 days only. Machine was loaned per insurance. Reports that snoring and mouth breathing is worse and causing dry mouth.  C/O Lower  back, just above the buttock, its throbbing all the time.    Past Medical History:   Diagnosis Date    Anxiety     Depression     GERD (gastroesophageal reflux disease)     H/O fibromyalgia     Obesity     TIA (transient ischemic attack)     unclear if diagnosis established per chart review of associated admission    Toxemia in pregnancy      Family History   Problem Relation Age of Onset    Hypertension Mother     Diabetes Mother     Diabetes Father     Hypertension Father     Hypertension Brother     Breast cancer Maternal Grandmother     Stomach cancer Maternal Grandfather     Colon cancer Paternal Grandmother      Social History     Socioeconomic History    Marital status: Single   Tobacco Use    Smoking status: Passive Smoke Exposure - Never Smoker    Smokeless tobacco: Never    Tobacco comments:     in high school   Substance and Sexual Activity    Alcohol use: Yes     Comment: red wine 2x week    Drug use: No    Sexual activity: Never   Social History Narrative    Live w/ children      Social Determinants of Health     Financial Resource Strain: Unknown    Difficulty of Paying Living Expenses: Patient refused   Food Insecurity: Unknown    Worried About Running Out of Food in the Last Year: Patient refused    Ran Out of Food in the Last Year: Patient refused   Transportation Needs: Unmet Transportation Needs    Lack of Transportation (Medical): Yes    Lack of Transportation (Non-Medical): Yes   Physical Activity: Sufficiently Active    Days of Exercise per Week: 3 days    Minutes of Exercise per Session: 60 min   Stress: Stress Concern Present    Feeling of Stress : To some extent   Social Connections: Unknown    Frequency of Communication with Friends and Family: More than three times a week    Frequency of Social Gatherings with Friends and Family: Once a week    Active Member of Clubs or Organizations: Yes    Attends Club or Organization Meetings: 1 to 4 times per year    Marital Status: Living with partner    Housing Stability: High Risk    Unable to Pay for Housing in the Last Year: Yes    Unstable Housing in the Last Year: Patient refused     Outpatient Encounter Medications as of 3/15/2023   Medication Sig Dispense Refill    albuterol (PROVENTIL/VENTOLIN HFA) 90 mcg/actuation inhaler Inhale 2 puffs into the lungs every 4 (four) hours as needed for Wheezing. 18 g 3    cholecalciferol, vitamin D3, 1,250 mcg (50,000 unit) capsule Take 1 capsule (50,000 Units total) by mouth every 7 days. 8 capsule 0    DULoxetine (CYMBALTA) 30 MG capsule Take 2 capsules (60 mg total) by mouth once daily. 60 capsule 11    lisinopriL-hydrochlorothiazide (PRINZIDE,ZESTORETIC) 10-12.5 mg per tablet Take 1 tablet by mouth once daily.      multivitamin-min-iron-FA-vit K 45 mg iron- 800 mcg-120 mcg Cap Take by mouth.      ondansetron (ZOFRAN) 4 MG tablet Take 2 tablets (8 mg total) by mouth every 8 (eight) hours as needed for Nausea. 30 tablet 0    oxybutynin (DITROPAN) 5 MG Tab Take 1 tablet (5 mg total) by mouth 2 (two) times daily. 60 tablet 2    pantoprazole (PROTONIX) 40 MG tablet Take 40 mg by mouth.      [DISCONTINUED] lisinopriL (PRINIVIL,ZESTRIL) 40 MG tablet Take 1 tablet (40 mg total) by mouth once daily. 4 tablet 0     No facility-administered encounter medications on file as of 3/15/2023.       Review of Systems    Review of Systems      A complete 10 point ROS was completed and are positive as per above HPI.    Otherwise negative for fever, diplopia, chest pain, shortness of breath, vomiting, blood in urine, joint pain, skin rash, seizures and unusual bleeding.     Objective:      LMP 03/10/2023   Physical Exam  Musculoskeletal:      Right shoulder: Tenderness present. Normal range of motion.      Left shoulder: Tenderness present. Normal range of motion.        Arms:       Lumbar back: Tenderness present.        Back:        CONSTITUTIONAL: No apparent distress. Appears comfortable. Does not appear acutely ill or septic.    PULMONARY: Breathing unlabored. No retractions Chest expansion grossly normal.  PSYCHIATRIC: Alert and conversant and grossly oriented. Mood is grossly neutral. Affect appropriate. Judgment and insight grossly intact.  NEUROLOGIC: No focal sensory deficits reported.   Results for orders placed or performed in visit on 01/28/21   Vitamin D   Result Value Ref Range    Vit D, 25-Hydroxy 13 (L) 30 - 96 ng/mL   Hemoglobin A1C   Result Value Ref Range    Hemoglobin A1C 5.6 4.0 - 5.6 %    Estimated Avg Glucose 114 68 - 131 mg/dL   CBC Auto Differential   Result Value Ref Range    WBC 4.16 3.90 - 12.70 K/uL    RBC 4.50 4.00 - 5.40 M/uL    Hemoglobin 13.4 12.0 - 16.0 g/dL    Hematocrit 42.1 37.0 - 48.5 %    MCV 94 82 - 98 fL    MCH 29.8 27.0 - 31.0 pg    MCHC 31.8 (L) 32.0 - 36.0 g/dL    RDW 13.9 11.5 - 14.5 %    Platelets 265 150 - 350 K/uL    MPV 11.1 9.2 - 12.9 fL    Immature Granulocytes 0.2 0.0 - 0.5 %    Gran # (ANC) 2.1 1.8 - 7.7 K/uL    Immature Grans (Abs) 0.01 0.00 - 0.04 K/uL    Lymph # 1.4 1.0 - 4.8 K/uL    Mono # 0.5 0.3 - 1.0 K/uL    Eos # 0.1 0.0 - 0.5 K/uL    Baso # 0.04 0.00 - 0.20 K/uL    nRBC 0 0 /100 WBC    Gran % 49.3 38.0 - 73.0 %    Lymph % 34.6 18.0 - 48.0 %    Mono % 12.7 4.0 - 15.0 %    Eosinophil % 2.2 0.0 - 8.0 %    Basophil % 1.0 0.0 - 1.9 %    Differential Method Automated    Comprehensive Metabolic Panel   Result Value Ref Range    Sodium 140 136 - 145 mmol/L    Potassium 4.6 3.5 - 5.1 mmol/L    Chloride 106 95 - 110 mmol/L    CO2 26 23 - 29 mmol/L    Glucose 79 70 - 110 mg/dL    BUN 17 6 - 20 mg/dL    Creatinine 0.8 0.5 - 1.4 mg/dL    Calcium 8.6 (L) 8.7 - 10.5 mg/dL    Total Protein 7.1 6.0 - 8.4 g/dL    Albumin 3.4 (L) 3.5 - 5.2 g/dL    Total Bilirubin 0.3 0.1 - 1.0 mg/dL    Alkaline Phosphatase 67 55 - 135 U/L    AST 19 10 - 40 U/L    ALT 19 10 - 44 U/L    Anion Gap 8 8 - 16 mmol/L    eGFR if African American >60.0 >60 mL/min/1.73 m^2    eGFR if non African American >60.0 >60 mL/min/1.73  m^2   Hepatitis C Antibody   Result Value Ref Range    Hepatitis C Ab Negative Negative     Assessment:       1. Neuropathy, arm, right    2. Essential hypertension    3. Chronic pain of both shoulders    4. Macromastia    5. Other chronic gastritis without hemorrhage    6. Class 3 severe obesity with serious comorbidity and body mass index (BMI) of 40.0 to 44.9 in adult, unspecified obesity type    7. BMI 40.0-44.9, adult    8. Sacroiliitis    9. DEANGELO (obstructive sleep apnea)    10. Chronic midline low back pain without sciatica    11. Intractable nausea and vomiting        Plan:   Neuropathy, arm, right  -     Ambulatory referral/consult to Physical/Occupational Therapy; Future; Expected date: 03/22/2023    Essential hypertension  Comments:  not quite controlled, allow to continue current med, RTC for BP check  Orders:  -     Hypertension Digital Medicine (HDMP) Enrollment Order  -     Hypertension Digital Medicine (HDMP): Assign Onboarding Questionnaires    Chronic pain of both shoulders  -     Ambulatory referral/consult to Physical/Occupational Therapy; Future; Expected date: 03/22/2023    Macromastia  Comments:  patient to find plastic surgeon and will send referral    Other chronic gastritis without hemorrhage    Class 3 severe obesity with serious comorbidity and body mass index (BMI) of 40.0 to 44.9 in adult, unspecified obesity type    BMI 40.0-44.9, adult    Sacroiliitis  Comments:  conservative therapy with tylenol, consider referral if no improvement.  Orders:  -     X-Ray Sacrum And Coccyx; Future; Expected date: 03/15/2023    DEANGELO (obstructive sleep apnea)  Comments:  sleep clinic to treat  Orders:  -     Ambulatory referral/consult to Sleep Disorders; Future; Expected date: 03/22/2023    Chronic midline low back pain without sciatica  -     X-Ray Lumbar Spine Ap And Lateral; Future; Expected date: 03/15/2023    Intractable nausea and vomiting  -     ondansetron (ZOFRAN) 4 MG tablet; Take 2 tablets (8  mg total) by mouth every 8 (eight) hours as needed for Nausea.  Dispense: 30 tablet; Refill: 0    I have reviewed all of the patient's clinical history available in care everywhere and Epic and have utilized this in my evaluation and management recommendations today.     Treatment options and alternatives were discussed with the patient. Patient was given ample time to ask questions. All questions were answered. Voices understanding and acceptance of this advice. Will call back if any further questions or concerns.    Haylee Martin MD

## 2023-03-16 ENCOUNTER — TELEPHONE (OUTPATIENT)
Dept: PRIMARY CARE CLINIC | Facility: CLINIC | Age: 43
End: 2023-03-16
Payer: MEDICAID

## 2023-03-16 ENCOUNTER — PATIENT MESSAGE (OUTPATIENT)
Dept: PULMONOLOGY | Facility: CLINIC | Age: 43
End: 2023-03-16
Payer: MEDICAID

## 2023-03-22 DIAGNOSIS — I10 HYPERTENSION: ICD-10-CM

## 2023-05-08 ENCOUNTER — HOSPITAL ENCOUNTER (OUTPATIENT)
Dept: RADIOLOGY | Facility: HOSPITAL | Age: 43
Discharge: HOME | End: 2023-05-08
Attending: FAMILY MEDICINE
Payer: MEDICAID

## 2023-05-08 DIAGNOSIS — M46.1 SACROILIITIS: ICD-10-CM

## 2023-05-08 DIAGNOSIS — M54.50 CHRONIC MIDLINE LOW BACK PAIN WITHOUT SCIATICA: ICD-10-CM

## 2023-05-08 DIAGNOSIS — G89.29 CHRONIC MIDLINE LOW BACK PAIN WITHOUT SCIATICA: ICD-10-CM

## 2023-05-08 PROCEDURE — 72100 X-RAY EXAM L-S SPINE 2/3 VWS: CPT | Mod: 26,,, | Performed by: RADIOLOGY

## 2023-05-08 PROCEDURE — 72220 X-RAY EXAM SACRUM TAILBONE: CPT | Mod: TC,PO

## 2023-05-08 PROCEDURE — 72100 XR LUMBAR SPINE AP AND LATERAL: ICD-10-PCS | Mod: 26,,, | Performed by: RADIOLOGY

## 2023-05-08 PROCEDURE — 72100 X-RAY EXAM L-S SPINE 2/3 VWS: CPT | Mod: TC,PO

## 2023-05-08 PROCEDURE — 72220 XR SACRUM AND COCCYX: ICD-10-PCS | Mod: 26,,, | Performed by: RADIOLOGY

## 2023-05-08 PROCEDURE — 72220 X-RAY EXAM SACRUM TAILBONE: CPT | Mod: 26,,, | Performed by: RADIOLOGY

## 2023-05-09 ENCOUNTER — HOSPITAL ENCOUNTER (OUTPATIENT)
Dept: RADIOLOGY | Facility: HOSPITAL | Age: 43
Discharge: HOME | End: 2023-05-09
Attending: NURSE PRACTITIONER
Payer: MEDICAID

## 2023-05-09 DIAGNOSIS — Z12.31 ENCOUNTER FOR SCREENING MAMMOGRAM FOR MALIGNANT NEOPLASM OF BREAST: ICD-10-CM

## 2023-05-09 PROCEDURE — 77063 BREAST TOMOSYNTHESIS BI: CPT | Mod: 26,,, | Performed by: RADIOLOGY

## 2023-05-09 PROCEDURE — 77067 MAMMO DIGITAL SCREENING BILAT WITH TOMO: ICD-10-PCS | Mod: 26,,, | Performed by: RADIOLOGY

## 2023-05-09 PROCEDURE — 77067 SCR MAMMO BI INCL CAD: CPT | Mod: TC,PO

## 2023-05-09 PROCEDURE — 77063 MAMMO DIGITAL SCREENING BILAT WITH TOMO: ICD-10-PCS | Mod: 26,,, | Performed by: RADIOLOGY

## 2023-05-09 PROCEDURE — 77067 SCR MAMMO BI INCL CAD: CPT | Mod: 26,,, | Performed by: RADIOLOGY

## 2023-05-12 ENCOUNTER — TELEPHONE (OUTPATIENT)
Dept: PRIMARY CARE CLINIC | Facility: CLINIC | Age: 43
End: 2023-05-12
Payer: MEDICAID

## 2023-05-12 NOTE — PROGRESS NOTES
Your xray is normal without any acute findings.  Continue treatment as discussed during your visit and return to clinic in 2 weeks if your symptoms continue.

## 2023-05-12 NOTE — PROGRESS NOTES
Abnormal xray: your xray shows degenerative changes to your L5-S1 joint.  Early arthritis treatment is conservative management, consisting of anti-inflammatory medication such as ibuprofen for pain, rest, and activity modification during flare ups. Exercise as tolerated to strengthen your muscles if no flare ups.

## 2023-05-12 NOTE — TELEPHONE ENCOUNTER
Pt called regarding x-ray and labs. Pt informed of results and provider orders, pt voiced understanding.  ----- Message from Haylee Martin MD sent at 5/12/2023  8:10 AM CDT -----  Abnormal xray: your xray shows degenerative changes to your L5-S1 joint.  Early arthritis treatment is conservative management, consisting of anti-inflammatory medication such as ibuprofen for pain, rest, and activity modification during flare ups. Exercise as tolerated to strengthen your muscles if no flare ups.

## 2023-08-04 ENCOUNTER — PATIENT MESSAGE (OUTPATIENT)
Dept: PRIMARY CARE CLINIC | Facility: CLINIC | Age: 43
End: 2023-08-04
Payer: MEDICAID

## 2023-08-11 ENCOUNTER — OFFICE VISIT (OUTPATIENT)
Dept: PRIMARY CARE CLINIC | Facility: CLINIC | Age: 43
End: 2023-08-11
Payer: MEDICAID

## 2023-08-11 DIAGNOSIS — E55.9 VITAMIN D INSUFFICIENCY: ICD-10-CM

## 2023-08-11 DIAGNOSIS — K21.9 GASTROESOPHAGEAL REFLUX DISEASE WITHOUT ESOPHAGITIS: Primary | ICD-10-CM

## 2023-08-11 DIAGNOSIS — R11.2 INTRACTABLE NAUSEA AND VOMITING: ICD-10-CM

## 2023-08-11 DIAGNOSIS — Z98.84 H/O BARIATRIC SURGERY: ICD-10-CM

## 2023-08-11 PROCEDURE — 4010F ACE/ARB THERAPY RXD/TAKEN: CPT | Mod: CPTII,95,, | Performed by: FAMILY MEDICINE

## 2023-08-11 PROCEDURE — 99214 PR OFFICE/OUTPT VISIT, EST, LEVL IV, 30-39 MIN: ICD-10-PCS | Mod: 95,,, | Performed by: FAMILY MEDICINE

## 2023-08-11 PROCEDURE — 4010F PR ACE/ARB THEARPY RXD/TAKEN: ICD-10-PCS | Mod: CPTII,95,, | Performed by: FAMILY MEDICINE

## 2023-08-11 PROCEDURE — 99214 OFFICE O/P EST MOD 30 MIN: CPT | Mod: 95,,, | Performed by: FAMILY MEDICINE

## 2023-08-11 RX ORDER — PANTOPRAZOLE SODIUM 40 MG/1
40 TABLET, DELAYED RELEASE ORAL DAILY
Qty: 90 TABLET | Refills: 3 | Status: SHIPPED | OUTPATIENT
Start: 2023-08-11 | End: 2024-08-10

## 2023-08-11 RX ORDER — ONDANSETRON 4 MG/1
4 TABLET, ORALLY DISINTEGRATING ORAL EVERY 8 HOURS PRN
Qty: 30 TABLET | Refills: 2 | Status: SHIPPED | OUTPATIENT
Start: 2023-08-11 | End: 2023-09-10

## 2023-08-11 RX ORDER — ASPIRIN 325 MG
50000 TABLET, DELAYED RELEASE (ENTERIC COATED) ORAL
Qty: 12 CAPSULE | Refills: 3 | Status: SHIPPED | OUTPATIENT
Start: 2023-08-11 | End: 2024-08-10

## 2023-08-11 NOTE — PROGRESS NOTES
Subjective:    The patient location is: [place] at home  Visit type: Virtual visit with synchronous audio and video  Total time spent with patient:30 mins  This includes face to face time and non-face to face time preparing to see the patient (eg, review of tests), obtaining and/or reviewing separately obtained history, documenting clinical information in the electronic or other health record, independently interpreting results and communicating results to the patient/family/caregiver, or care coordinator.   Each patient to whom he or she provides medical services by telemedicine is:  (1) informed of the relationship between the physician and patient and the respective role of any other health care provider with respect to management of the patient; and (2) notified that he or she may decline to receive medical services by telemedicine and may withdraw from such care at any time.   Patient ID: Yane Dobbins is a 43 y.o. female.                                         Chief Complaint: Nausea, acid reflux  Preoperative evaluation    History of Present Illness:      Yane Dobbins is a 43 y.o. female who presents to the office today for a preoperative consultation at the request of Dr Arturo Le  who plans on performing Gastric Bypass by the end of the month.  The office has done her labs and EKG and requesting clearance. Unable to clear her today as I have never seen her in person and last recorded BP was not quite controlled, has HTN.  She is requesting refill on her supplements and Zofran for nausea. She had a sleeve in 2021 and have lost over 100 lbs but having a lot of problems with acid reflux and not able to tolerate her prescribed diet.      Family History   Problem Relation Age of Onset    Breast cancer Mother     Hypertension Mother     Diabetes Mother     Diabetes Father     Hypertension Father     Breast cancer Maternal Grandmother     Stomach cancer Maternal Grandfather     Colon cancer Paternal  Grandmother     Hypertension Brother      Social History     Socioeconomic History    Marital status: Single   Tobacco Use    Smoking status: Passive Smoke Exposure - Never Smoker    Smokeless tobacco: Never    Tobacco comments:     in high school   Substance and Sexual Activity    Alcohol use: Yes     Comment: red wine 2x week    Drug use: No    Sexual activity: Never   Social History Narrative    Live w/ children      Social Determinants of Health     Financial Resource Strain: Unknown (8/11/2023)    Overall Financial Resource Strain (CARDIA)     Difficulty of Paying Living Expenses: Patient refused   Food Insecurity: Unknown (8/11/2023)    Hunger Vital Sign     Worried About Running Out of Food in the Last Year: Patient refused     Ran Out of Food in the Last Year: Patient refused   Transportation Needs: Unknown (8/11/2023)    PRAPARE - Transportation     Lack of Transportation (Medical): Patient refused     Lack of Transportation (Non-Medical): Patient refused   Physical Activity: Inactive (8/11/2023)    Exercise Vital Sign     Days of Exercise per Week: 0 days     Minutes of Exercise per Session: 0 min   Stress: Unknown (8/11/2023)    [employer] [place] [employer] - Occupational Stress Questionnaire     Feeling of Stress : Patient refused   Social Connections: Unknown (8/11/2023)    Social Connection and Isolation Panel [NHANES]     Frequency of Communication with Friends and Family: More than three times a week     Frequency of Social Gatherings with Friends and Family: Patient refused     Active Member of Clubs or Organizations: Yes     Attends Club or Organization Meetings: Patient refused     Marital Status: Never    Housing Stability: Unknown (8/11/2023)    Housing Stability Vital Sign     Unable to Pay for Housing in the Last Year: Patient refused     Unstable Housing in the Last Year: Patient refused     Outpatient Encounter Medications as of 8/11/2023   Medication Sig Dispense Refill     albuterol (PROVENTIL/VENTOLIN HFA) 90 mcg/actuation inhaler Inhale 2 puffs into the lungs every 4 (four) hours as needed for Wheezing. 18 g 3    cholecalciferol, vitamin D3, 1,250 mcg (50,000 unit) capsule Take 1 capsule (50,000 Units total) by mouth every 7 days. 12 capsule 3    DULoxetine (CYMBALTA) 30 MG capsule Take 2 capsules (60 mg total) by mouth once daily. 60 capsule 11    lisinopriL-hydrochlorothiazide (PRINZIDE,ZESTORETIC) 10-12.5 mg per tablet Take 1 tablet by mouth once daily.      multivitamin-min-iron-FA-vit K 45 mg iron- 800 mcg-120 mcg Cap Take 1 tablet by mouth once daily. 90 capsule 3    ondansetron (ZOFRAN-ODT) 4 MG TbDL Take 1 tablet (4 mg total) by mouth every 8 (eight) hours as needed (N/V). 30 tablet 2    oxybutynin (DITROPAN) 5 MG Tab Take 1 tablet (5 mg total) by mouth 2 (two) times daily. 60 tablet 2    pantoprazole (PROTONIX) 40 MG tablet Take 1 tablet (40 mg total) by mouth once daily. 90 tablet 3    [DISCONTINUED] cholecalciferol, vitamin D3, 1,250 mcg (50,000 unit) capsule Take 1 capsule (50,000 Units total) by mouth every 7 days. 8 capsule 0    [DISCONTINUED] multivitamin-min-iron-FA-vit K 45 mg iron- 800 mcg-120 mcg Cap Take by mouth.      [DISCONTINUED] pantoprazole (PROTONIX) 40 MG tablet Take 40 mg by mouth.       No facility-administered encounter medications on file as of 8/11/2023.       Review of Systems    A complete 10 point ROS was completed and are positive as per above HPI. Otherwise negative for fever, diplopia, chest pain, shortness of breath, vomiting, blood in urine, joint pain, skin rash, seizures and unusual bleeding.     Objective:      There were no vitals taken for this visit.  Physical Exam    CONSTITUTIONAL: No apparent distress. Appears comfortable. Does not appear acutely ill or septic. Appears adequately hydrated.  CARDIOVASCULAR: No perioral cyanosis  PULMONARY: Breathing unlabored. No retractions Chest expansion grossly normal.  PSYCHIATRIC: Alert and  conversant and grossly oriented. Mood is grossly neutral. Affect appropriate. Judgment and insight grossly intact.  NEUROLOGIC: No focal sensory deficits reported.   Results for orders placed or performed in visit on 05/09/23   Comprehensive Metabolic Panel   Result Value Ref Range    Sodium 140 136 - 145 mmol/L    Potassium 4.1 3.5 - 5.1 mmol/L    Chloride 105 95 - 110 mmol/L    CO2 29 23 - 29 mmol/L    Glucose 84 70 - 110 mg/dL    BUN 11 6 - 20 mg/dL    Creatinine 0.7 0.5 - 1.4 mg/dL    Calcium 9.1 8.7 - 10.5 mg/dL    Total Protein 7.2 6.0 - 8.4 g/dL    Albumin 3.6 3.5 - 5.2 g/dL    Total Bilirubin 0.4 0.1 - 1.0 mg/dL    Alkaline Phosphatase 67 55 - 135 U/L    AST 22 10 - 40 U/L    ALT 12 10 - 44 U/L    Anion Gap 6 (L) 8 - 16 mmol/L    eGFR >60.0 >60 mL/min/1.73 m^2     Assessment:       1. Gastroesophageal reflux disease without esophagitis    2. Vitamin D insufficiency    3. Intractable nausea and vomiting    4. H/O bariatric surgery        Plan:   Gastroesophageal reflux disease without esophagitis  -     pantoprazole (PROTONIX) 40 MG tablet; Take 1 tablet (40 mg total) by mouth once daily.  Dispense: 90 tablet; Refill: 3    Vitamin D insufficiency  -     cholecalciferol, vitamin D3, 1,250 mcg (50,000 unit) capsule; Take 1 capsule (50,000 Units total) by mouth every 7 days.  Dispense: 12 capsule; Refill: 3    Intractable nausea and vomiting  -     ondansetron (ZOFRAN-ODT) 4 MG TbDL; Take 1 tablet (4 mg total) by mouth every 8 (eight) hours as needed (N/V).  Dispense: 30 tablet; Refill: 2    H/O bariatric surgery  -     multivitamin-min-iron-FA-vit K 45 mg iron- 800 mcg-120 mcg Cap; Take 1 tablet by mouth once daily.  Dispense: 90 capsule; Refill: 3        I have reviewed all of the patient's clinical history available in care everywhere and Epic and have utilized this in my evaluation and management recommendations today.      Treatment options and alternatives were discussed with the patient. Patient was given  ample time to ask questions. All questions were answered. Voices understanding and acceptance of this advice. Will call back if any further questions or concerns.                Haylee Martin MD  [facility],

## 2023-08-24 ENCOUNTER — HOSPITAL ENCOUNTER (EMERGENCY)
Facility: HOSPITAL | Age: 43
Discharge: HOME | End: 2023-08-24
Attending: EMERGENCY MEDICINE
Payer: MEDICAID

## 2023-08-24 VITALS
RESPIRATION RATE: 16 BRPM | SYSTOLIC BLOOD PRESSURE: 165 MMHG | OXYGEN SATURATION: 96 % | HEART RATE: 77 BPM | BODY MASS INDEX: 44.83 KG/M2 | HEIGHT: 63 IN | WEIGHT: 253 LBS | TEMPERATURE: 98 F | DIASTOLIC BLOOD PRESSURE: 97 MMHG

## 2023-08-24 DIAGNOSIS — S60.459A FOREIGN BODY IN SKIN OF FINGER, INITIAL ENCOUNTER: Primary | ICD-10-CM

## 2023-08-24 PROCEDURE — 25000003 PHARM REV CODE 250: Performed by: NURSE PRACTITIONER

## 2023-08-24 PROCEDURE — 99284 EMERGENCY DEPT VISIT MOD MDM: CPT | Mod: 25

## 2023-08-24 PROCEDURE — 10120 INC&RMVL FB SUBQ TISS SMPL: CPT

## 2023-08-24 RX ORDER — SULFAMETHOXAZOLE AND TRIMETHOPRIM 800; 160 MG/1; MG/1
1 TABLET ORAL 2 TIMES DAILY
Qty: 14 TABLET | Refills: 0 | Status: SHIPPED | OUTPATIENT
Start: 2023-08-24 | End: 2023-08-31

## 2023-08-24 RX ORDER — LIDOCAINE HYDROCHLORIDE 10 MG/ML
10 INJECTION, SOLUTION EPIDURAL; INFILTRATION; INTRACAUDAL; PERINEURAL
Status: COMPLETED | OUTPATIENT
Start: 2023-08-24 | End: 2023-08-24

## 2023-08-24 RX ADMIN — LIDOCAINE HYDROCHLORIDE 100 MG: 10 INJECTION, SOLUTION EPIDURAL; INFILTRATION; INTRACAUDAL at 10:08

## 2023-08-25 NOTE — ED PROVIDER NOTES
Encounter Date: 2023       History     Chief Complaint   Patient presents with    Hand Pain     Reports piece of plastic stuck in right middle finger (d/t washing dishes)     Patient is a 43-year-old female who presents with a foreign body in the right middle finger.  Onset was 2 nights ago.  States that something plastic got imbedded in her skin while doing the dishes.  Patient shows no signs of distress at this time.      Review of patient's allergies indicates:   Allergen Reactions    Penicillins Diarrhea, Itching and Rash     Past Medical History:   Diagnosis Date    Anxiety     Depression     GERD (gastroesophageal reflux disease)     H/O fibromyalgia     Obesity     TIA (transient ischemic attack)     unclear if diagnosis established per chart review of associated admission    Toxemia in pregnancy      Past Surgical History:   Procedure Laterality Date     SECTION      x2    CHOLECYSTECTOMY      FOOT SURGERY       Family History   Problem Relation Age of Onset    Breast cancer Mother     Hypertension Mother     Diabetes Mother     Diabetes Father     Hypertension Father     Breast cancer Maternal Grandmother     Stomach cancer Maternal Grandfather     Colon cancer Paternal Grandmother     Hypertension Brother      Social History     Tobacco Use    Smoking status: Passive Smoke Exposure - Never Smoker    Smokeless tobacco: Never    Tobacco comments:     in high school   Substance Use Topics    Alcohol use: Yes     Comment: red wine 2x week    Drug use: No     Review of Systems   Constitutional:  Negative for fever.   HENT:  Negative for sore throat.    Respiratory:  Negative for shortness of breath.    Cardiovascular:  Negative for chest pain.   Gastrointestinal:  Negative for nausea.   Genitourinary:  Negative for dysuria.   Musculoskeletal:  Negative for back pain.   Skin:  Positive for wound (foreign body in finger). Negative for rash.   Neurological:  Negative for weakness.   Hematological:   Does not bruise/bleed easily.       Physical Exam     Initial Vitals [08/24/23 2138]   BP Pulse Resp Temp SpO2   (!) 165/97 77 16 98 °F (36.7 °C) 96 %      MAP       --         Physical Exam    Nursing note and vitals reviewed.  Constitutional: She appears well-developed and well-nourished.   HENT:   Head: Normocephalic and atraumatic.   Eyes: EOM are normal. Pupils are equal, round, and reactive to light.   Neck: Neck supple.   Normal range of motion.  Cardiovascular:  Normal rate, regular rhythm, normal heart sounds and intact distal pulses.           Pulmonary/Chest: Breath sounds normal.   Abdominal: Abdomen is soft. Bowel sounds are normal.   Musculoskeletal:         General: Normal range of motion.        Hands:       Cervical back: Normal range of motion and neck supple.     Neurological: She is alert and oriented to person, place, and time. She has normal strength and normal reflexes.   Skin: Skin is warm and dry.         ED Course   Foreign Body    Date/Time: 8/24/2023 10:30 PM    Performed by: Enrrique Vides NP  Authorized by: Gee Mirza MD  Body area: skin  General location: upper extremity  Location details: right long finger  Anesthesia: local infiltration    Anesthesia:  Local Anesthetic: lidocaine 1% without epinephrine  Localization method: visualized  Removal mechanism: scalpel  Dressing: dressing applied  Tendon involvement: none  Depth: subcutaneous  Complexity: simple  1 objects recovered.  Objects recovered: Piece of plastic  Post-procedure assessment: foreign body removed  Patient tolerance: Patient tolerated the procedure well with no immediate complications      Labs Reviewed - No data to display       Imaging Results    None          Medications   LIDOcaine (PF) 10 mg/ml (1%) injection 100 mg (100 mg Infiltration Given by Other 8/24/23 2208)     Medical Decision Making  Patient instructed take antibiotics as prescribed and follow-up with primary care physician for further evaluation.   Patient to return to the emergency room with any worsening symptoms.  No distress noted at time of discharge.    Risk  Prescription drug management.                               Clinical Impression:   Final diagnoses:  [V92.428V] Foreign body in skin of finger, initial encounter (Primary)        ED Disposition Condition    Discharge Stable          ED Prescriptions       Medication Sig Dispense Start Date End Date Auth. Provider    sulfamethoxazole-trimethoprim 800-160mg (BACTRIM DS) 800-160 mg Tab Take 1 tablet by mouth 2 (two) times daily. for 7 days 14 tablet 8/24/2023 8/31/2023 Enrrique Vides NP          Follow-up Information       Follow up With Specialties Details Why Contact Info    Haylee Martin MD Family Medicine  As needed [address]  [address]  [facility] [id number] [phone]               Enrrique Vides NP  08/24/23 2209

## 2023-09-01 ENCOUNTER — OFFICE VISIT (OUTPATIENT)
Dept: PRIMARY CARE CLINIC | Facility: CLINIC | Age: 43
End: 2023-09-01
Payer: MEDICAID

## 2023-09-01 ENCOUNTER — HOSPITAL ENCOUNTER (OUTPATIENT)
Dept: RADIOLOGY | Facility: HOSPITAL | Age: 43
Discharge: HOME | End: 2023-09-01
Attending: NURSE PRACTITIONER
Payer: MEDICAID

## 2023-09-01 VITALS
WEIGHT: 256.63 LBS | TEMPERATURE: 97 F | HEART RATE: 85 BPM | BODY MASS INDEX: 45.47 KG/M2 | SYSTOLIC BLOOD PRESSURE: 162 MMHG | OXYGEN SATURATION: 98 % | HEIGHT: 63 IN | DIASTOLIC BLOOD PRESSURE: 94 MMHG

## 2023-09-01 DIAGNOSIS — Z01.818 PRE-OP EXAM: ICD-10-CM

## 2023-09-01 DIAGNOSIS — Z01.818 PRE-OP EXAM: Primary | ICD-10-CM

## 2023-09-01 PROCEDURE — 3080F PR MOST RECENT DIASTOLIC BLOOD PRESSURE >= 90 MM HG: ICD-10-PCS | Mod: CPTII,,, | Performed by: NURSE PRACTITIONER

## 2023-09-01 PROCEDURE — 3008F PR BODY MASS INDEX (BMI) DOCUMENTED: ICD-10-PCS | Mod: CPTII,,, | Performed by: NURSE PRACTITIONER

## 2023-09-01 PROCEDURE — 99214 OFFICE O/P EST MOD 30 MIN: CPT | Mod: S$PBB,,, | Performed by: NURSE PRACTITIONER

## 2023-09-01 PROCEDURE — 99999 PR PBB SHADOW E&M-EST. PATIENT-LVL IV: CPT | Mod: PBBFAC,,, | Performed by: NURSE PRACTITIONER

## 2023-09-01 PROCEDURE — 71046 X-RAY EXAM CHEST 2 VIEWS: CPT | Mod: 26,,, | Performed by: STUDENT IN AN ORGANIZED HEALTH CARE EDUCATION/TRAINING PROGRAM

## 2023-09-01 PROCEDURE — 3077F PR MOST RECENT SYSTOLIC BLOOD PRESSURE >= 140 MM HG: ICD-10-PCS | Mod: CPTII,,, | Performed by: NURSE PRACTITIONER

## 2023-09-01 PROCEDURE — 93010 EKG 12-LEAD: ICD-10-PCS | Mod: S$PBB,,, | Performed by: INTERNAL MEDICINE

## 2023-09-01 PROCEDURE — 71046 XR CHEST PA AND LATERAL: ICD-10-PCS | Mod: 26,,, | Performed by: STUDENT IN AN ORGANIZED HEALTH CARE EDUCATION/TRAINING PROGRAM

## 2023-09-01 PROCEDURE — 99999 PR PBB SHADOW E&M-EST. PATIENT-LVL IV: ICD-10-PCS | Mod: PBBFAC,,, | Performed by: NURSE PRACTITIONER

## 2023-09-01 PROCEDURE — 3044F PR MOST RECENT HEMOGLOBIN A1C LEVEL <7.0%: ICD-10-PCS | Mod: CPTII,,, | Performed by: NURSE PRACTITIONER

## 2023-09-01 PROCEDURE — 93005 ELECTROCARDIOGRAM TRACING: CPT | Mod: PBBFAC,PN | Performed by: INTERNAL MEDICINE

## 2023-09-01 PROCEDURE — 99214 OFFICE O/P EST MOD 30 MIN: CPT | Mod: PBBFAC,25,PN | Performed by: NURSE PRACTITIONER

## 2023-09-01 PROCEDURE — 71046 X-RAY EXAM CHEST 2 VIEWS: CPT | Mod: TC,PN

## 2023-09-01 PROCEDURE — 1160F RVW MEDS BY RX/DR IN RCRD: CPT | Mod: CPTII,,, | Performed by: NURSE PRACTITIONER

## 2023-09-01 PROCEDURE — 3044F HG A1C LEVEL LT 7.0%: CPT | Mod: CPTII,,, | Performed by: NURSE PRACTITIONER

## 2023-09-01 PROCEDURE — 3008F BODY MASS INDEX DOCD: CPT | Mod: CPTII,,, | Performed by: NURSE PRACTITIONER

## 2023-09-01 PROCEDURE — 1159F PR MEDICATION LIST DOCUMENTED IN MEDICAL RECORD: ICD-10-PCS | Mod: CPTII,,, | Performed by: NURSE PRACTITIONER

## 2023-09-01 PROCEDURE — 99214 PR OFFICE/OUTPT VISIT, EST, LEVL IV, 30-39 MIN: ICD-10-PCS | Mod: S$PBB,,, | Performed by: NURSE PRACTITIONER

## 2023-09-01 PROCEDURE — 4010F PR ACE/ARB THEARPY RXD/TAKEN: ICD-10-PCS | Mod: CPTII,,, | Performed by: NURSE PRACTITIONER

## 2023-09-01 PROCEDURE — 3080F DIAST BP >= 90 MM HG: CPT | Mod: CPTII,,, | Performed by: NURSE PRACTITIONER

## 2023-09-01 PROCEDURE — 3077F SYST BP >= 140 MM HG: CPT | Mod: CPTII,,, | Performed by: NURSE PRACTITIONER

## 2023-09-01 PROCEDURE — 1159F MED LIST DOCD IN RCRD: CPT | Mod: CPTII,,, | Performed by: NURSE PRACTITIONER

## 2023-09-01 PROCEDURE — 93010 ELECTROCARDIOGRAM REPORT: CPT | Mod: S$PBB,,, | Performed by: INTERNAL MEDICINE

## 2023-09-01 PROCEDURE — 4010F ACE/ARB THERAPY RXD/TAKEN: CPT | Mod: CPTII,,, | Performed by: NURSE PRACTITIONER

## 2023-09-01 PROCEDURE — 1160F PR REVIEW ALL MEDS BY PRESCRIBER/CLIN PHARMACIST DOCUMENTED: ICD-10-PCS | Mod: CPTII,,, | Performed by: NURSE PRACTITIONER

## 2023-09-10 NOTE — PROGRESS NOTES
Subjective:       Patient ID: Yane Dobbins is a 43 y.o. female.    Chief Complaint: Follow-up (Preop )      History of Present Illness:   Yane Dobbins 43 y.o. female presents today for preop clearance that includes the EKG, labs and physical assessment.  Patient denies any significant medical history at this time additionally she denies any infectious or communicable diseases.Treatment options and alternatives were discussed with the patient. Patient provided opportunity to ask additional questions.  All questions were answered. Voices understanding and acceptance of this advice. Instructed to call back if any further questions or concerns.     Past Medical History:   Diagnosis Date    Anxiety     Depression     GERD (gastroesophageal reflux disease)     H/O fibromyalgia     Obesity     TIA (transient ischemic attack)     unclear if diagnosis established per chart review of associated admission    Toxemia in pregnancy      Family History   Problem Relation Age of Onset    Breast cancer Mother     Hypertension Mother     Diabetes Mother     Diabetes Father     Hypertension Father     Breast cancer Maternal Grandmother     Stomach cancer Maternal Grandfather     Colon cancer Paternal Grandmother     Hypertension Brother      Social History     Socioeconomic History    Marital status: Single   Tobacco Use    Smoking status: Never     Passive exposure: Yes    Smokeless tobacco: Never    Tobacco comments:     in high school   Substance and Sexual Activity    Alcohol use: Yes     Comment: red wine 2x week    Drug use: No    Sexual activity: Never   Social History Narrative    Live w/ children      Social Determinants of Health     Financial Resource Strain: Unknown (8/29/2023)    Overall Financial Resource Strain (CARDIA)     Difficulty of Paying Living Expenses: Patient refused   Food Insecurity: Unknown (8/29/2023)    Hunger Vital Sign     Worried About Running Out of Food in the Last Year: Patient refused     Ran  Out of Food in the Last Year: Patient refused   Transportation Needs: Unknown (8/29/2023)    PRAPARE - Transportation     Lack of Transportation (Medical): Patient refused     Lack of Transportation (Non-Medical): Patient refused   Physical Activity: Unknown (8/29/2023)    Exercise Vital Sign     Days of Exercise per Week: Patient refused     Minutes of Exercise per Session: 0 min   Recent Concern: Physical Activity - Inactive (8/11/2023)    Exercise Vital Sign     Days of Exercise per Week: 0 days     Minutes of Exercise per Session: 0 min   Stress: Unknown (8/11/2023)    [employer] - Occupational Stress Questionnaire     Feeling of Stress : Patient refused   Social Connections: Unknown (8/29/2023)    Social Connection and Isolation Panel [NHANES]     Frequency of Communication with Friends and Family: Patient refused     Frequency of Social Gatherings with Friends and Family: Patient refused     Active Member of Clubs or Organizations: Patient refused     Attends Club or Organization Meetings: Patient refused     Marital Status: Patient refused   Housing Stability: Unknown (8/29/2023)    Housing Stability Vital Sign     Unable to Pay for Housing in the Last Year: Patient refused     Unstable Housing in the Last Year: Patient refused     Outpatient Encounter Medications as of 9/1/2023   Medication Sig Dispense Refill    albuterol (PROVENTIL/VENTOLIN HFA) 90 mcg/actuation inhaler Inhale 2 puffs into the lungs every 4 (four) hours as needed for Wheezing. 18 g 3    cholecalciferol, vitamin D3, 1,250 mcg (50,000 unit) capsule Take 1 capsule (50,000 Units total) by mouth every 7 days. 12 capsule 3    lisinopriL-hydrochlorothiazide (PRINZIDE,ZESTORETIC) 10-12.5 mg per tablet Take 1 tablet by mouth once daily.      multivitamin-min-iron-FA-vit K 45 mg iron- 800 mcg-120 mcg Cap Take 1 tablet by mouth once daily. 90 capsule 3    ondansetron (ZOFRAN-ODT) 4 MG TbDL Take 1 tablet (4 mg total) by  "mouth every 8 (eight) hours as needed (N/V). 30 tablet 2    pantoprazole (PROTONIX) 40 MG tablet Take 1 tablet (40 mg total) by mouth once daily. 90 tablet 3    DULoxetine (CYMBALTA) 30 MG capsule Take 2 capsules (60 mg total) by mouth once daily. (Patient not taking: Reported on 2023) 60 capsule 11    oxybutynin (DITROPAN) 5 MG Tab Take 1 tablet (5 mg total) by mouth 2 (two) times daily. (Patient not taking: Reported on 2023) 60 tablet 2    [] sulfamethoxazole-trimethoprim 800-160mg (BACTRIM DS) 800-160 mg Tab Take 1 tablet by mouth 2 (two) times daily. for 7 days (Patient not taking: Reported on 2023) 14 tablet 0     No facility-administered encounter medications on file as of 2023.       Review of Systems   Constitutional:  Negative for appetite change, chills and fever.   HENT:  Negative for ear pain, sinus pressure, sore throat and trouble swallowing.    Eyes:  Negative for visual disturbance.   Respiratory:  Negative for shortness of breath.    Cardiovascular:  Negative for chest pain.   Gastrointestinal:  Negative for abdominal pain, diarrhea, nausea and vomiting.   Endocrine: Negative for cold intolerance, polyphagia and polyuria.   Genitourinary:  Negative for decreased urine volume and dysuria.   Musculoskeletal:  Negative for back pain.   Skin:  Negative for rash.   Allergic/Immunologic: Negative for environmental allergies and food allergies.   Neurological:  Negative for dizziness, tremors, weakness and numbness.   Hematological:  Does not bruise/bleed easily.   Psychiatric/Behavioral:  Negative for confusion and hallucinations. The patient is not nervous/anxious and is not hyperactive.    All other systems reviewed and are negative.      Objective:      BP (!) 162/94 (BP Location: Left arm, Patient Position: Sitting, BP Method: Large (Manual))   Pulse 85   Temp 97.2 °F (36.2 °C) (Oral)   Ht 5' 3" (1.6 m)   Wt 116.4 kg (256 lb 10.1 oz)   SpO2 98%   BMI 45.46 kg/m²   Physical " Exam  Constitutional:       Appearance: Normal appearance.   Neurological:      Mental Status: She is alert.         Results for orders placed or performed in visit on 05/09/23   Comprehensive Metabolic Panel   Result Value Ref Range    Sodium 140 136 - 145 mmol/L    Potassium 4.1 3.5 - 5.1 mmol/L    Chloride 105 95 - 110 mmol/L    CO2 29 23 - 29 mmol/L    Glucose 84 70 - 110 mg/dL    BUN 11 6 - 20 mg/dL    Creatinine 0.7 0.5 - 1.4 mg/dL    Calcium 9.1 8.7 - 10.5 mg/dL    Total Protein 7.2 6.0 - 8.4 g/dL    Albumin 3.6 3.5 - 5.2 g/dL    Total Bilirubin 0.4 0.1 - 1.0 mg/dL    Alkaline Phosphatase 67 55 - 135 U/L    AST 22 10 - 40 U/L    ALT 12 10 - 44 U/L    Anion Gap 6 (L) 8 - 16 mmol/L    eGFR >60.0 >60 mL/min/1.73 m^2     Assessment:       1. Pre-op exam        Plan:   Yane was seen today for follow-up.    Diagnoses and all orders for this visit:    Pre-op exam  -     CBC Auto Differential; Future  -     Comprehensive Metabolic Panel; Future  -     Hemoglobin A1C; Future  -     X-Ray Chest PA And Lateral; Future  -     EKG 12-lead; Future  -     EKG 12-lead              [facility]   [address] [place] [address] [address]  [place] [address]  Office [phone]  Fax [phone]

## 2023-09-22 ENCOUNTER — PATIENT OUTREACH (OUTPATIENT)
Dept: ADMINISTRATIVE | Facility: HOSPITAL | Age: 43
End: 2023-09-22
Payer: MEDICAID

## 2024-04-12 ENCOUNTER — PATIENT MESSAGE (OUTPATIENT)
Dept: ADMINISTRATIVE | Facility: HOSPITAL | Age: 44
End: 2024-04-12
Payer: MEDICAID

## 2024-04-18 ENCOUNTER — TELEPHONE (OUTPATIENT)
Dept: PRIMARY CARE CLINIC | Facility: CLINIC | Age: 44
End: 2024-04-18
Payer: MEDICAID

## 2024-04-18 NOTE — TELEPHONE ENCOUNTER
Called pt to inquire of BP. Patient states she is no longer a patient of Dr. Martin, resident in [place]. Provider notified.

## 2024-05-22 DIAGNOSIS — Z12.31 OTHER SCREENING MAMMOGRAM: ICD-10-CM

## 2024-07-09 ENCOUNTER — PATIENT MESSAGE (OUTPATIENT)
Dept: ADMINISTRATIVE | Facility: HOSPITAL | Age: 44
End: 2024-07-09
Payer: MEDICAID

## 2024-09-05 ENCOUNTER — PATIENT OUTREACH (OUTPATIENT)
Dept: ADMINISTRATIVE | Facility: HOSPITAL | Age: 44
End: 2024-09-05
Payer: MEDICAID

## 2024-09-05 NOTE — PROGRESS NOTES
Health Maintenance Due   Topic Date Due    TETANUS VACCINE  09/08/2008    Mammogram  05/09/2024    Influenza Vaccine (1) 09/01/2024    COVID-19 Vaccine (2 - 2023-24 season) 09/01/2024    Patient lives in [place].